# Patient Record
Sex: MALE | Race: WHITE | NOT HISPANIC OR LATINO | Employment: OTHER | URBAN - NONMETROPOLITAN AREA
[De-identification: names, ages, dates, MRNs, and addresses within clinical notes are randomized per-mention and may not be internally consistent; named-entity substitution may affect disease eponyms.]

---

## 2017-01-11 DIAGNOSIS — E78.2 MIXED HYPERLIPIDEMIA: Primary | ICD-10-CM

## 2017-01-11 DIAGNOSIS — Z78.9 STATIN INTOLERANCE: ICD-10-CM

## 2017-01-11 DIAGNOSIS — I25.10 ATHEROSCLEROSIS OF NATIVE CORONARY ARTERY OF NATIVE HEART WITHOUT ANGINA PECTORIS: ICD-10-CM

## 2017-01-11 NOTE — PROGRESS NOTES
Mr. Snow requested we send a prescription for Repatha to [facility]. He wants to try again to see if leg pain is tolerable. He was approved last year, but may have to repeat PA process with new plan year and new insurance.

## 2017-03-10 DIAGNOSIS — I25.118 ATHEROSCLEROSIS OF NATIVE CORONARY ARTERY OF NATIVE HEART WITH STABLE ANGINA PECTORIS (HCC): Primary | ICD-10-CM

## 2017-03-10 PROBLEM — I25.10 ATHEROSCLEROSIS OF NATIVE CORONARY ARTERY OF NATIVE HEART WITHOUT ANGINA PECTORIS: Status: ACTIVE | Noted: 2017-03-10

## 2017-03-10 RX ORDER — ISOSORBIDE MONONITRATE 30 MG/1
30 TABLET, EXTENDED RELEASE ORAL EVERY MORNING
Qty: 30 TABLET | Refills: 11 | Status: SHIPPED | OUTPATIENT
Start: 2017-03-10 | End: 2018-03-10

## 2018-12-14 DIAGNOSIS — I44.7 LEFT BUNDLE BRANCH BLOCK: ICD-10-CM

## 2018-12-14 DIAGNOSIS — I25.119 ATHEROSCLEROSIS OF NATIVE CORONARY ARTERY OF NATIVE HEART WITH ANGINA PECTORIS (HCC): Primary | ICD-10-CM

## 2018-12-14 NOTE — PROGRESS NOTES
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 He requested I order a stress test - has had recurrence of angina similar to but not as severe as previous angina before his stent in 2015. Stress sestamibi ordered. He would like to schedule for next Thursday 12/20/18 at 7 am at [facility]. Has history of LBBB so stress echocardiogram not indicated.

## 2018-12-27 DIAGNOSIS — R94.39 ABNORMAL NUCLEAR STRESS TEST: ICD-10-CM

## 2018-12-27 DIAGNOSIS — I25.118 ATHEROSCLEROSIS OF NATIVE CORONARY ARTERY OF NATIVE HEART WITH STABLE ANGINA PECTORIS (HCC): Primary | ICD-10-CM

## 2018-12-27 DIAGNOSIS — I20.8 STABLE ANGINA PECTORIS (HCC): ICD-10-CM

## 2018-12-27 PROBLEM — I20.89 STABLE ANGINA PECTORIS: Status: ACTIVE | Noted: 2018-12-27

## 2018-12-31 DIAGNOSIS — I44.7 LEFT BUNDLE BRANCH BLOCK: ICD-10-CM

## 2018-12-31 DIAGNOSIS — I25.119 ATHEROSCLEROSIS OF NATIVE CORONARY ARTERY OF NATIVE HEART WITH ANGINA PECTORIS (HCC): ICD-10-CM

## 2019-01-07 DIAGNOSIS — R94.39 ABNORMAL NUCLEAR STRESS TEST: ICD-10-CM

## 2019-01-07 DIAGNOSIS — I25.119 ATHEROSCLEROSIS OF NATIVE CORONARY ARTERY OF NATIVE HEART WITH ANGINA PECTORIS (HCC): Primary | ICD-10-CM

## 2019-01-09 ENCOUNTER — TELEPHONE (OUTPATIENT)
Dept: CARDIOLOGY | Facility: CLINIC | Age: 69
End: 2019-01-09

## 2019-01-09 DIAGNOSIS — R94.39 ABNORMAL NUCLEAR STRESS TEST: ICD-10-CM

## 2019-01-09 DIAGNOSIS — I25.119 ATHEROSCLEROSIS OF NATIVE CORONARY ARTERY OF NATIVE HEART WITH ANGINA PECTORIS (HCC): Primary | ICD-10-CM

## 2019-01-10 DIAGNOSIS — I25.119 ATHEROSCLEROSIS OF NATIVE CORONARY ARTERY OF NATIVE HEART WITH ANGINA PECTORIS (HCC): ICD-10-CM

## 2019-01-10 DIAGNOSIS — R94.39 ABNORMAL NUCLEAR STRESS TEST: ICD-10-CM

## 2019-01-11 ENCOUNTER — HOSPITAL ENCOUNTER (OUTPATIENT)
Facility: HOSPITAL | Age: 69
Discharge: HOME | End: 2019-01-11
Attending: INTERNAL MEDICINE | Admitting: INTERNAL MEDICINE

## 2019-01-11 VITALS
HEART RATE: 64 BPM | RESPIRATION RATE: 18 BRPM | DIASTOLIC BLOOD PRESSURE: 85 MMHG | BODY MASS INDEX: 31.24 KG/M2 | HEIGHT: 66 IN | SYSTOLIC BLOOD PRESSURE: 143 MMHG | WEIGHT: 194.4 LBS | OXYGEN SATURATION: 99 % | TEMPERATURE: 97.4 F

## 2019-01-11 DIAGNOSIS — R94.39 ABNORMAL NUCLEAR STRESS TEST: ICD-10-CM

## 2019-01-11 DIAGNOSIS — I25.119 ATHEROSCLEROSIS OF NATIVE CORONARY ARTERY OF NATIVE HEART WITH ANGINA PECTORIS (HCC): ICD-10-CM

## 2019-01-11 PROCEDURE — 25010000002 HEPARIN (PORCINE) PER 1000 UNITS: Performed by: INTERNAL MEDICINE

## 2019-01-11 PROCEDURE — 25010000002 FENTANYL CITRATE (PF) 100 MCG/2ML SOLUTION: Performed by: INTERNAL MEDICINE

## 2019-01-11 PROCEDURE — 25010000002 IOPAMIDOL 61 % SOLUTION: Performed by: INTERNAL MEDICINE

## 2019-01-11 PROCEDURE — 99152 MOD SED SAME PHYS/QHP 5/>YRS: CPT | Performed by: INTERNAL MEDICINE

## 2019-01-11 PROCEDURE — 25010000002 MIDAZOLAM PER 1 MG: Performed by: INTERNAL MEDICINE

## 2019-01-11 PROCEDURE — C1894 INTRO/SHEATH, NON-LASER: HCPCS | Performed by: INTERNAL MEDICINE

## 2019-01-11 PROCEDURE — 93458 L HRT ARTERY/VENTRICLE ANGIO: CPT | Performed by: INTERNAL MEDICINE

## 2019-01-11 PROCEDURE — C1760 CLOSURE DEV, VASC: HCPCS | Performed by: INTERNAL MEDICINE

## 2019-01-11 PROCEDURE — C1769 GUIDE WIRE: HCPCS | Performed by: INTERNAL MEDICINE

## 2019-01-11 RX ORDER — ACETAMINOPHEN 325 MG/1
650 TABLET ORAL EVERY 4 HOURS PRN
Status: DISCONTINUED | OUTPATIENT
Start: 2019-01-11 | End: 2019-01-11 | Stop reason: HOSPADM

## 2019-01-11 RX ORDER — SODIUM CHLORIDE 9 MG/ML
125 INJECTION, SOLUTION INTRAVENOUS CONTINUOUS
Status: DISPENSED | OUTPATIENT
Start: 2019-01-11 | End: 2019-01-11

## 2019-01-11 RX ORDER — EZETIMIBE 10 MG/1
10 TABLET ORAL DAILY
COMMUNITY

## 2019-01-11 RX ORDER — SODIUM CHLORIDE 0.9 % (FLUSH) 0.9 %
3 SYRINGE (ML) INJECTION EVERY 12 HOURS SCHEDULED
Status: DISCONTINUED | OUTPATIENT
Start: 2019-01-11 | End: 2019-01-11 | Stop reason: HOSPADM

## 2019-01-11 RX ORDER — PRASUGREL 10 MG/1
10 TABLET, FILM COATED ORAL DAILY
Status: DISCONTINUED | OUTPATIENT
Start: 2019-01-12 | End: 2019-01-11 | Stop reason: HOSPADM

## 2019-01-11 RX ORDER — FINASTERIDE 5 MG/1
5 TABLET, FILM COATED ORAL DAILY
COMMUNITY

## 2019-01-11 RX ORDER — SODIUM CHLORIDE 0.9 % (FLUSH) 0.9 %
3-10 SYRINGE (ML) INJECTION AS NEEDED
Status: DISCONTINUED | OUTPATIENT
Start: 2019-01-11 | End: 2019-01-11 | Stop reason: HOSPADM

## 2019-01-11 RX ORDER — LIDOCAINE HYDROCHLORIDE 20 MG/ML
INJECTION, SOLUTION INFILTRATION; PERINEURAL AS NEEDED
Status: DISCONTINUED | OUTPATIENT
Start: 2019-01-11 | End: 2019-01-11 | Stop reason: HOSPADM

## 2019-01-11 RX ORDER — MIDAZOLAM HYDROCHLORIDE 1 MG/ML
INJECTION INTRAMUSCULAR; INTRAVENOUS AS NEEDED
Status: DISCONTINUED | OUTPATIENT
Start: 2019-01-11 | End: 2019-01-11 | Stop reason: HOSPADM

## 2019-01-11 RX ORDER — MECLIZINE HCL 12.5 MG/1
12.5 TABLET ORAL 3 TIMES DAILY PRN
COMMUNITY

## 2019-01-11 RX ORDER — TAMSULOSIN HYDROCHLORIDE 0.4 MG/1
1 CAPSULE ORAL NIGHTLY
COMMUNITY
End: 2020-08-31

## 2019-01-11 RX ORDER — SODIUM CHLORIDE 9 MG/ML
1-3 INJECTION, SOLUTION INTRAVENOUS CONTINUOUS
Status: DISCONTINUED | OUTPATIENT
Start: 2019-01-11 | End: 2019-01-11 | Stop reason: HOSPADM

## 2019-01-11 RX ORDER — FENTANYL CITRATE 50 UG/ML
INJECTION, SOLUTION INTRAMUSCULAR; INTRAVENOUS AS NEEDED
Status: DISCONTINUED | OUTPATIENT
Start: 2019-01-11 | End: 2019-01-11 | Stop reason: HOSPADM

## 2019-01-11 RX ADMIN — SODIUM CHLORIDE 1 ML/KG/HR: 9 INJECTION, SOLUTION INTRAVENOUS at 11:35

## 2019-01-18 NOTE — H&P
No chief complaint on file.      Subjective .     History of present illness:  Apolinar Snow is a 68 y.o. yo male with history of chest pain and recent abnormal stress test  who presents today for cath.    History  Past Medical History:   Diagnosis Date   • Atherosclerosis of native coronary artery of native heart    • Chest pain    • Clotting disorder (CMS/HCC)    • Disease of thyroid gland    • Hyperlipidemia    • Hypertension    • LBBB (left bundle branch block)    ,   Past Surgical History:   Procedure Laterality Date   • APPENDECTOMY     • CARDIAC CATHETERIZATION      STENT   • CARDIAC CATHETERIZATION Left 1/11/2019    Procedure: Cardiac catheterization LCP with possible PCI;  Surgeon: Hipolito Quick MD;  Location: [facility] CATH INVASIVE LOCATION;  Service: Cardiology   • CHOLECYSTECTOMY     • CORONARY STENT PLACEMENT     • CYST REMOVAL     • KNEE ARTHROSCOPY     ,   Family History   Problem Relation Age of Onset   • Heart failure Mother    • Diabetes Mother    • Alzheimer's disease Mother    • Cancer Father    • Diabetes Brother    ,   Social History     Tobacco Use   • Smoking status: Never Smoker   • Smokeless tobacco: Never Used   Substance Use Topics   • Alcohol use: No   • Drug use: No   ,     Medications  No current facility-administered medications for this encounter.      Current Outpatient Medications   Medication Sig Dispense Refill   • acebutolol (SECTRAL) 200 MG capsule Take 200 mg by mouth 2 (two) times a day.     • aspirin 81 MG EC tablet Take 81 mg by mouth daily.     • cetirizine (ZyrTEC) 10 MG tablet Take 10 mg by mouth daily.     • coenzyme Q10 50 MG capsule capsule Take 50 mg by mouth daily.     • ezetimibe (ZETIA) 10 MG tablet Take 10 mg by mouth Daily.     • finasteride (PROSCAR) 5 MG tablet Take 5 mg by mouth Daily.     • levothyroxine (SYNTHROID, LEVOTHROID) 112 MCG tablet Take 112 mcg by mouth daily.     • meclizine (ANTIVERT) 12.5 MG tablet Take 12.5 mg by mouth 3 (Three) Times a Day  As Needed for dizziness.     • nitroglycerin (NITROSTAT) 0.4 MG SL tablet Place 0.4 mg under the tongue every 5 (five) minutes as needed for chest pain. Take no more than 3 doses in 15 minutes.     • tamsulosin (FLOMAX) 0.4 MG capsule 24 hr capsule Take 1 capsule by mouth Every Night.     • vitamin B-12 (CYANOCOBALAMIN) 500 MCG tablet Take 500 mcg by mouth daily.     • tadalafil (CIALIS) 10 MG tablet Take 1 tablet by mouth daily as needed for erectile dysfunction. 10 tablet 11   • vitamin D (ERGOCALCIFEROL) 62515 UNITS capsule capsule Take 50,000 Units by mouth 1 (one) time per week.         Allergies:  Patient has no known allergies.    Review of Systems  Review of Systems   HENT: Negative for nosebleeds.    Cardiovascular: Positive for chest pain. Negative for claudication, dyspnea on exertion, irregular heartbeat, leg swelling, near-syncope, orthopnea, palpitations, paroxysmal nocturnal dyspnea and syncope.   Respiratory: Negative for cough, hemoptysis and shortness of breath.    Gastrointestinal: Negative for dysphagia, hematemesis and melena.   Genitourinary: Negative for hematuria.   All other systems reviewed and are negative.      Objective     Physical Exam:  No data found.  Physical Exam   Constitutional: He is oriented to person, place, and time. He appears well-nourished. No distress.   HENT:   Head: Normocephalic.   Eyes: No scleral icterus.   Neck: Normal range of motion. Neck supple.   Cardiovascular: Normal rate, regular rhythm and normal heart sounds. Exam reveals no gallop and no friction rub.   No murmur heard.  Pulmonary/Chest: Effort normal and breath sounds normal. No respiratory distress. He has no wheezes. He has no rales.   Abdominal: Soft. Bowel sounds are normal. He exhibits no distension. There is no tenderness.   Musculoskeletal: He exhibits no edema.   Neurological: He is alert and oriented to person, place, and time.   Skin: Skin is warm and dry. He is not diaphoretic. No erythema.    Psychiatric: He has a normal mood and affect. His behavior is normal.       Results Review:   I reviewed the patient's new clinical results.  Lab Results (last 24 hours)     ** No results found for the last 24 hours. **        Imaging Results (last 24 hours)     ** No results found for the last 24 hours. **        No results found for: ECHOEFEST      Assessment/Plan     Abnormal nuclear stress test    New problems that need assessment:  1. Chest pain with abnormal stress test. Scheduled for cath. The risk and potential complications as well as anticipated benefits were discussed with the patient and they wish to proceed with the planned procedure

## 2019-04-12 ENCOUNTER — OFFICE VISIT (OUTPATIENT)
Dept: CARDIOLOGY | Facility: CLINIC | Age: 69
End: 2019-04-12

## 2019-04-12 VITALS
DIASTOLIC BLOOD PRESSURE: 80 MMHG | BODY MASS INDEX: 31.79 KG/M2 | HEART RATE: 69 BPM | HEIGHT: 66 IN | SYSTOLIC BLOOD PRESSURE: 120 MMHG | OXYGEN SATURATION: 97 % | WEIGHT: 197.8 LBS

## 2019-04-12 DIAGNOSIS — Z78.9 STATIN INTOLERANCE: Chronic | ICD-10-CM

## 2019-04-12 DIAGNOSIS — E78.2 MIXED HYPERLIPIDEMIA: Chronic | ICD-10-CM

## 2019-04-12 DIAGNOSIS — I10 ESSENTIAL HYPERTENSION: Primary | Chronic | ICD-10-CM

## 2019-04-12 DIAGNOSIS — I25.118 ATHEROSCLEROSIS OF NATIVE CORONARY ARTERY OF NATIVE HEART WITH STABLE ANGINA PECTORIS (HCC): Chronic | ICD-10-CM

## 2019-04-12 PROCEDURE — 99214 OFFICE O/P EST MOD 30 MIN: CPT | Performed by: NURSE PRACTITIONER

## 2019-04-12 NOTE — ASSESSMENT & PLAN NOTE
Fair control with Zetia. Encouraged healthy diet and routine aerobic exercise. May need to consider addition of PCSK9 inhibitor if LDL remains greater than 70. Due for wellness labs next month - he will send a copy of results.

## 2019-04-12 NOTE — PROGRESS NOTES
Subjective:     Encounter Date:04/12/2019    Chief Complaint:    Patient ID: Apolinar Snow is a 68 y.o. male here today for 2 year cardiac follow-up. He was last seen in the office by Dr. Randall 9/2016. He had a heart cath by Dr. Quick 1/2019 due to chest discomfort and an abnormal nuclear stress test 12/2018. He is a  at [employer] - retiring next month.     HPI     Coronary Artery Disease      Additional comments: has a twinge of chest discomfort (LINDA/LLchest) every few days, not effected by activity or eating, patent stent per 1/2019 cath              Hypertension      Additional comments: runs 120/70s              Hyperlipidemia      Additional comments: due to for wellness check and labs 5/2019          Last edited by Massiel Mirza APRN on 4/12/2019 12:49 PM. (History)        History:   Past Medical History:   Diagnosis Date   • Atherosclerosis of native coronary artery of native heart    • Chest pain    • Disease of thyroid gland    • Hyperlipidemia    • Hypertension    • LBBB (left bundle branch block)      Past Surgical History:   Procedure Laterality Date   • APPENDECTOMY     • CARDIAC CATHETERIZATION      STENT   • CARDIAC CATHETERIZATION Left 1/11/2019    Procedure: Cardiac catheterization LCP with possible PCI;  Surgeon: Hipolito Quick MD;  Location: [facility] CATH INVASIVE LOCATION;  Service: Cardiology   • CHOLECYSTECTOMY     • CORONARY STENT PLACEMENT     • CYST REMOVAL     • KNEE ARTHROSCOPY       Social History     Socioeconomic History   • Marital status:      Spouse name: Not on file   • Number of children: Not on file   • Years of education: Not on file   • Highest education level: Not on file   Tobacco Use   • Smoking status: Never Smoker   • Smokeless tobacco: Never Used   Substance and Sexual Activity   • Alcohol use: No   • Drug use: No   • Sexual activity: Defer     Family History   Problem Relation Age of Onset   • Heart failure Mother    • Diabetes Mother    •  Alzheimer's disease Mother    • Cancer Father    • Diabetes Brother        Outpatient Medications Marked as Taking for the 4/12/19 encounter (Office Visit) with Massiel Mirza APRN   Medication Sig Dispense Refill   • acebutolol (SECTRAL) 200 MG capsule Take 200 mg by mouth Daily.     • aspirin 81 MG EC tablet Take 81 mg by mouth daily.     • cetirizine (ZyrTEC) 10 MG tablet Take 10 mg by mouth daily.     • coenzyme Q10 100 MG capsule Take 100 mg by mouth Daily.     • ezetimibe (ZETIA) 10 MG tablet Take 10 mg by mouth Daily.     • finasteride (PROSCAR) 5 MG tablet Take 5 mg by mouth Daily.     • levothyroxine (SYNTHROID, LEVOTHROID) 112 MCG tablet Take 112 mcg by mouth daily.     • meclizine (ANTIVERT) 12.5 MG tablet Take 12.5 mg by mouth 3 (Three) Times a Day As Needed for dizziness.     • nitroglycerin (NITROSTAT) 0.4 MG SL tablet Place 0.4 mg under the tongue every 5 (five) minutes as needed for chest pain. Take no more than 3 doses in 15 minutes.     • tamsulosin (FLOMAX) 0.4 MG capsule 24 hr capsule Take 1 capsule by mouth Every Night.     • vitamin B-12 (CYANOCOBALAMIN) 500 MCG tablet Take 500 mcg by mouth daily.     • vitamin D (ERGOCALCIFEROL) 08777 UNITS capsule capsule Take 50,000 Units by mouth 1 (one) time per week.       Review of Systems:  Review of Systems   Constitution: Negative for chills, decreased appetite, fever, weakness and malaise/fatigue.   HENT: Negative for congestion and nosebleeds.    Eyes: Negative for blurred vision and double vision.   Cardiovascular: Positive for chest pain. Negative for dyspnea on exertion, irregular heartbeat, leg swelling, near-syncope, palpitations and syncope.   Respiratory: Negative for cough, shortness of breath, sputum production and wheezing.    Endocrine: Negative for cold intolerance and heat intolerance.   Skin: Negative for dry skin, itching and rash.   Musculoskeletal: Positive for back pain. Negative for arthritis, falls, joint pain, joint  "swelling, muscle cramps, neck pain and stiffness.   Gastrointestinal: Positive for diarrhea (occ ). Negative for abdominal pain, constipation, heartburn, melena, nausea and vomiting.   Genitourinary: Positive for nocturia (1 to 2 times a night  ). Negative for hematuria.   Neurological: Positive for loss of balance (occ ). Negative for excessive daytime sleepiness, dizziness, headaches, light-headedness and numbness.   Psychiatric/Behavioral: Negative for depression. The patient does not have insomnia and is not nervous/anxious.           Objective:   /80 (BP Location: Left arm, Patient Position: Sitting, Cuff Size: Adult)   Pulse 69   Ht 167.6 cm (66\")   Wt 89.7 kg (197 lb 12.8 oz)   SpO2 97%   BMI 31.93 kg/m²   Wt Readings from Last 3 Encounters:   04/12/19 89.7 kg (197 lb 12.8 oz)   01/11/19 88.2 kg (194 lb 6.4 oz)   09/21/16 89.8 kg (198 lb)         Physical Exam   Constitutional: He is oriented to person, place, and time. He appears well-developed and well-nourished.   HENT:   Head: Normocephalic and atraumatic.   Right Ear: Decreased hearing (mild) is noted.   Left Ear: Decreased hearing (mild) is noted.   Eyes: No scleral icterus.   Neck: No JVD present.   Cardiovascular: Normal rate, regular rhythm, normal heart sounds and intact distal pulses. Exam reveals no gallop and no friction rub.   No murmur heard.  Pulmonary/Chest: Effort normal and breath sounds normal.   Musculoskeletal: He exhibits no edema.   Neurological: He is alert and oriented to person, place, and time.   Skin: Skin is warm and dry.   Psychiatric: He has a normal mood and affect.   Vitals reviewed.    Lab/Diagnostics Review:   1/11/2019 cardiac catheterization  Nonobstructive CAD  Normal LV function  False positive stress test    1/10/2019  CBC WBC 8500 hemoglobin 14.7 hematocrit 43.2% platelets 222,000  CMP sodium 143 potassium 4.2 chloride 106 CO2 29.8 BUN 17 creatinine 1.1 total protein 7.0 albumin 4.0 total bilirubin 1.07 alk " phos 77 AST 30 ALT 45  Lipid panel total cholesterol 142 triglycerides 77 HDL 49 LDL 78    7/17/2018  Lipid panel total cholesterol 121 triglycerides 59 HDL 35 LDL 74  TSH 1.053 free T4 1.12 free T3 3.24  Vitamin D 59    2/20/2015 cardiac catheterization  1. Successful Xience 2.5 x 15 mm drug-eluting stent placement to the 90% stenosis of the distal left circumflex coronary artery just prior to the takeoff of the left posterior descending. Dr. Quick  2. Normal left ventricular function.      Procedures: none in office today        Assessment/Plan:         Problem List Items Addressed This Visit        Cardiovascular and Mediastinum    Hypertension - Primary (Chronic)    Current Assessment & Plan     Well controlled with medications. Continue present therapy.            Hyperlipidemia (Chronic)    Current Assessment & Plan     Fair control with Zetia. Encouraged healthy diet and routine aerobic exercise. May need to consider addition of PCSK9 inhibitor if LDL remains greater than 70. Due for wellness labs next month - he will send a copy of results.         Atherosclerosis of native coronary artery of native heart (Chronic)    Current Assessment & Plan     Stable. Continue present therapy. Call if any worsening.              Other    Statin intolerance (Chronic)        Return in about 2 years (around 4/12/2021) for Recheck.           Massiel Mirza, APRN, ACNP-BC, CHFN-BC

## 2020-08-31 ENCOUNTER — OFFICE VISIT (OUTPATIENT)
Dept: CARDIOLOGY | Facility: CLINIC | Age: 70
End: 2020-08-31

## 2020-08-31 VITALS
WEIGHT: 200.4 LBS | HEART RATE: 72 BPM | OXYGEN SATURATION: 99 % | HEIGHT: 65 IN | DIASTOLIC BLOOD PRESSURE: 84 MMHG | BODY MASS INDEX: 33.39 KG/M2 | SYSTOLIC BLOOD PRESSURE: 138 MMHG | TEMPERATURE: 97.8 F

## 2020-08-31 DIAGNOSIS — Z78.9 STATIN INTOLERANCE: Chronic | ICD-10-CM

## 2020-08-31 DIAGNOSIS — I25.10 ATHEROSCLEROSIS OF NATIVE CORONARY ARTERY OF NATIVE HEART WITHOUT ANGINA PECTORIS: Primary | Chronic | ICD-10-CM

## 2020-08-31 DIAGNOSIS — I10 ESSENTIAL HYPERTENSION: Chronic | ICD-10-CM

## 2020-08-31 DIAGNOSIS — E66.09 NON MORBID OBESITY DUE TO EXCESS CALORIES: Chronic | ICD-10-CM

## 2020-08-31 PROCEDURE — 99214 OFFICE O/P EST MOD 30 MIN: CPT | Performed by: NURSE PRACTITIONER

## 2020-08-31 PROCEDURE — 93000 ELECTROCARDIOGRAM COMPLETE: CPT | Performed by: NURSE PRACTITIONER

## 2020-08-31 RX ORDER — ONDANSETRON 8 MG/1
TABLET, ORALLY DISINTEGRATING ORAL
COMMUNITY

## 2020-08-31 RX ORDER — ALFUZOSIN HYDROCHLORIDE 10 MG/1
TABLET, EXTENDED RELEASE ORAL
COMMUNITY

## 2020-08-31 RX ORDER — MELATONIN
1000 DAILY
COMMUNITY
End: 2021-09-20 | Stop reason: ALTCHOICE

## 2020-08-31 RX ORDER — LANOLIN ALCOHOL/MO/W.PET/CERES
CREAM (GRAM) TOPICAL
COMMUNITY
End: 2021-09-20 | Stop reason: ALTCHOICE

## 2020-08-31 RX ORDER — NAPROXEN 500 MG/1
TABLET ORAL
COMMUNITY

## 2020-08-31 RX ORDER — DIMENHYDRINATE 50 MG
100 TABLET ORAL DAILY
COMMUNITY

## 2020-08-31 NOTE — ASSESSMENT & PLAN NOTE
Encouraged weight loss with healthy diet and gradual increase in routine aerobic activity. Goal BMI less than 30.

## 2020-08-31 NOTE — ASSESSMENT & PLAN NOTE
Fair control with Zetia. Statin intolerance. Encouraged healthy diet and routine aerobic exercise. May need to consider addition of PCSK9 inhibitor if LDL remains greater than 70.

## 2020-08-31 NOTE — ASSESSMENT & PLAN NOTE
Previously well controlled with medications. Continue present therapy. Monitor BP and call if persistently greater than 130/80 per home readings. May need to increase or add medications.

## 2020-08-31 NOTE — ASSESSMENT & PLAN NOTE
Stable without angina. Continue present therapy. Call if worsens. Encouraged lifestyle modifications.

## 2020-08-31 NOTE — PROGRESS NOTES
Subjective:     Encounter Date:08/31/2020    Chief Complaint:    Patient ID: Apolinar Snow is a 69 y.o. male here today for yearly cardiac follow-up. He has done well other than being hospitalized with MRSA requiring I&D and IV antibiotics in May after cleaning his daughter's pool. He is a retired  from [employer].    HPI     Coronary Artery Disease      Additional comments: Patent stent with 40% RCA per 1/2019 cath after false positive stress, no chest discomfort, working at home but not exercising - doing all the housework - wife has inflammatory myopathy. 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              Hypertension      Additional comments: doesn't check at home              Hyperlipidemia      Additional comments: had labs 6/2020 - reportedly good          Last edited by Massiel Mirza APRN on 8/31/2020  5:23 PM. (History)        History:   Past Medical History:   Diagnosis Date   • Atherosclerosis of native coronary artery of native heart    • Chest pain    • Disease of thyroid gland    • Hyperlipidemia    • Hypertension    • LBBB (left bundle branch block)      Past Surgical History:   Procedure Laterality Date   • APPENDECTOMY     • CARDIAC CATHETERIZATION      STENT   • CARDIAC CATHETERIZATION Left 1/11/2019    Procedure: Cardiac catheterization LCP with possible PCI;  Surgeon: Hipolito Quick MD;  Location: [facility] CATH INVASIVE LOCATION;  Service: Cardiology   • CHOLECYSTECTOMY     • CORONARY STENT PLACEMENT  02/20/2015    [facility], Dr. Quick, L CX Xience 2.5x15 mm MAGDA   • CYST REMOVAL     • KNEE ARTHROSCOPY       Social History     Socioeconomic History   • Marital status:      Spouse name: Not on file   • Number of children: Not on file   • Years of education: Not on file   • Highest education level: Not on file   Tobacco Use   • Smoking status: Never Smoker   • Smokeless tobacco: Never Used   Substance and Sexual Activity   • Alcohol use: Not Currently      Frequency: Never     Comment: never drank regularly or heavy - occasional beer in the past   • Drug use: Never   • Sexual activity: Defer     Family History   Problem Relation Age of Onset   • Heart failure Mother    • Diabetes Mother    • Alzheimer's disease Mother    • Prostate cancer Father    • Diabetes Brother      Outpatient Medications Marked as Taking for the 8/31/20 encounter (Office Visit) with Massiel Mirza APRN   Medication Sig Dispense Refill   • acebutolol (SECTRAL) 200 MG capsule Take 200 mg by mouth Daily.     • alfuzosin (UROXATRAL) 10 MG 24 hr tablet alfuzosin ER 10 mg tablet,extended release 24 hr     • aspirin 81 MG EC tablet Take 81 mg by mouth daily.     • cetirizine (ZyrTEC) 10 MG tablet Take 10 mg by mouth daily.     • cholecalciferol (VITAMIN D3) 25 MCG (1000 UT) tablet Take 1,000 Units by mouth Daily.     • Coenzyme Q10 (CO Q-10) 100 MG capsule Take 100 mg by mouth Daily.     • coenzyme Q10 100 MG capsule Take 100 mg by mouth Daily.     • ezetimibe (ZETIA) 10 MG tablet Take 10 mg by mouth Daily.     • finasteride (PROSCAR) 5 MG tablet Take 5 mg by mouth Daily.     • levothyroxine (SYNTHROID, LEVOTHROID) 125 MCG tablet Take 125 mcg by mouth Daily.     • meclizine (ANTIVERT) 12.5 MG tablet Take 12.5 mg by mouth 3 (Three) Times a Day As Needed for dizziness.     • naproxen (NAPROSYN) 500 MG tablet naproxen 500 mg tablet   prn     • nitroglycerin (NITROSTAT) 0.4 MG SL tablet Place 0.4 mg under the tongue every 5 (five) minutes as needed for chest pain. Take no more than 3 doses in 15 minutes.     • ondansetron ODT (ZOFRAN-ODT) 8 MG disintegrating tablet ondansetron 8 mg disintegrating tablet     • SUPER B COMPLEX/C PO B Complex 1.7 mg-20 mg-2 mg-1.2 mg/mL sublingual liquid   Place by sublingual route.     • vitamin B-12 (CYANOCOBALAMIN) 1000 MCG tablet B12-Resin 1,000 mcg tablet   Take 1 tablet every day by oral route.     • vitamin B-12 (CYANOCOBALAMIN) 500 MCG tablet Take 500 mcg by  "mouth daily.     • vitamin D (ERGOCALCIFEROL) 64822 UNITS capsule capsule Take 50,000 Units by mouth 1 (one) time per week.       Review of Systems:  Review of Systems   Constitution: Negative for chills, decreased appetite, fever and malaise/fatigue.   HENT: Negative for congestion and nosebleeds.    Eyes: Negative for blurred vision and double vision.   Cardiovascular: Negative for chest pain, dyspnea on exertion, irregular heartbeat, leg swelling, near-syncope, palpitations and syncope.   Respiratory: Negative for cough, shortness of breath, sputum production and wheezing.    Endocrine: Negative for cold intolerance and heat intolerance.   Hematologic/Lymphatic: Does not bruise/bleed easily.   Skin: Negative for dry skin, itching and rash.   Musculoskeletal: Positive for back pain. Negative for arthritis, falls, joint pain, joint swelling, muscle cramps, neck pain and stiffness.   Gastrointestinal: Negative for abdominal pain, constipation, diarrhea (occ ), heartburn, melena, nausea and vomiting.   Genitourinary: Positive for nocturia (1 to 2 times a night  ). Negative for hematuria.   Neurological: Positive for loss of balance (occ ). Negative for excessive daytime sleepiness, dizziness, headaches, light-headedness, numbness and weakness.   Psychiatric/Behavioral: Negative for depression. The patient does not have insomnia and is not nervous/anxious.         Objective:   /84 (BP Location: Left arm, Patient Position: Sitting, Cuff Size: Adult)   Pulse 72   Temp 97.8 °F (36.6 °C)   Ht 165.1 cm (65\")   Wt 90.9 kg (200 lb 6.4 oz)   SpO2 99%   BMI 33.35 kg/m²   Wt Readings from Last 3 Encounters:   08/31/20 90.9 kg (200 lb 6.4 oz)   04/12/19 89.7 kg (197 lb 12.8 oz)   01/11/19 88.2 kg (194 lb 6.4 oz)       Physical Exam   Constitutional: He is oriented to person, place, and time. He appears well-developed and well-nourished.   HENT:   Head: Normocephalic and atraumatic.   Right Ear: Decreased hearing " (mild) is noted.   Left Ear: Decreased hearing (mild) is noted.   Eyes: No scleral icterus.   Neck: No JVD present.   Cardiovascular: Normal rate, regular rhythm, normal heart sounds and intact distal pulses. Exam reveals no gallop and no friction rub.   No murmur heard.  Pulmonary/Chest: Effort normal and breath sounds normal.   Musculoskeletal: He exhibits no edema.   Neurological: He is alert and oriented to person, place, and time.   Skin: Skin is warm and dry.   Psychiatric: He has a normal mood and affect.   Vitals reviewed.    Lab/Diagnostics Review:   6/29/2020 [facility]  CBC WBC 6200 hemoglobin 14.9 hematocrit 44.3% platelets 194,000  CMP sodium 143 potassium 4.1 chloride 103 CO2 32 BUN 21 creatinine 1.13 calcium 9.4 glucose 92 total protein 6.7 albumin 4.5 alk phos 57 AST 25 ALT 26 total bilirubin 1.3  Vitamin D 62.3  Lipid panel total cholesterol 151 triglycerides 88 HDL 43 LDL 90    5/21/2020 [facility]  CBC WBC 11,500 hemoglobin 14.3 hematocrit 41.6% platelets 182,000  BMP sodium 140 potassium 3.9 chloride 104 CO2 26.2 BUN 14 creatinine 1.13 calcium 9.2 glucose 106    [facility]  [facility]  Normal LV function  False positive stress test     1/10/2019  CBC WBC 8500 hemoglobin 14.7 hematocrit 43.2% platelets 222,000  CMP sodium 143 potassium 4.2 chloride 106 CO2 29.8 BUN 17 creatinine 1.1 total protein 7.0 albumin 4.0 total bilirubin 1.07 alk phos 77 AST 30 ALT 45  Lipid panel total cholesterol 142 triglycerides 77 HDL 49 LDL 78     7/17/2018  Lipid panel total cholesterol 121 triglycerides 59 HDL 35 LDL 74  TSH 1.053 free T4 1.12 free T3 3.24  Vitamin D 59     2/20/2015 cardiac catheterization, [facility], Dr. Quick  1. Successful Xience 2.5 x 15 mm drug-eluting stent placement to the 90% stenosis of the distal left circumflex coronary artery just prior to the takeoff of the left posterior descending. Dr. Quick  2. Normal left ventricular function.     ECG 12  Lead  Date/Time: 8/31/2020 2:04 PM  Performed by: Massiel Mirza APRN  Authorized by: Massiel Mirza APRN   Rhythm: sinus rhythm  Rate: normal  BPM: 63  Conduction: left bundle branch block and 1st degree AV block    Clinical impression: abnormal EKG              Assessment/Plan:       Problem List Items Addressed This Visit        Cardiovascular and Mediastinum    Atherosclerosis of native coronary artery of native heart - Primary (Chronic)    Current Assessment & Plan     Stable without angina. Continue present therapy. Call if worsens. Encouraged lifestyle modifications.           Relevant Orders    ECG 12 Lead (Completed)    Hypertension (Chronic)    Current Assessment & Plan     Previously well controlled with medications. Continue present therapy. Monitor BP and call if persistently greater than 130/80 per home readings. May need to increase or add medications.              Digestive    Non morbid obesity due to excess calories (Chronic)    Current Assessment & Plan     Encouraged weight loss with healthy diet and gradual increase in routine aerobic activity. Goal BMI less than 30.            Other    Statin intolerance (Chronic)        Advance Care Planning   ACP discussion was held with the patient during this visit. Patient has an advance directive (not in EMR), copy requested.    Return in about 1 year (around 8/31/2021) for Recheck.         JOSEPH Gallardo, ACNP-BC, CHFN-BC

## 2021-03-08 ENCOUNTER — TELEPHONE (OUTPATIENT)
Dept: CARDIOLOGY | Facility: CLINIC | Age: 71
End: 2021-03-08

## 2021-03-08 DIAGNOSIS — R06.09 DYSPNEA ON EXERTION: ICD-10-CM

## 2021-03-08 DIAGNOSIS — I25.118 ATHEROSCLEROSIS OF NATIVE CORONARY ARTERY OF NATIVE HEART WITH OTHER FORM OF ANGINA PECTORIS (HCC): Primary | Chronic | ICD-10-CM

## 2021-03-08 NOTE — TELEPHONE ENCOUNTER
Wants a stress test, having SOB and would like to have one since last stress was in 2018. He states with physical activity is when he is having to SOB.

## 2021-03-16 ENCOUNTER — OUTSIDE FACILITY SERVICE (OUTPATIENT)
Dept: CARDIOLOGY | Facility: CLINIC | Age: 71
End: 2021-03-16

## 2021-03-16 PROCEDURE — 93350 STRESS TTE ONLY: CPT | Performed by: INTERNAL MEDICINE

## 2021-03-16 PROCEDURE — 93018 CV STRESS TEST I&R ONLY: CPT | Performed by: INTERNAL MEDICINE

## 2021-03-17 DIAGNOSIS — R06.09 DYSPNEA ON EXERTION: ICD-10-CM

## 2021-03-17 DIAGNOSIS — I25.118 ATHEROSCLEROSIS OF NATIVE CORONARY ARTERY OF NATIVE HEART WITH OTHER FORM OF ANGINA PECTORIS (HCC): Chronic | ICD-10-CM

## 2021-03-17 NOTE — PROGRESS NOTES
Notified patient of technically difficult stress echocardiogram results most likely due to L BBB. He reports others have noticed he was SOA with exertion, but he hasn't noticed. Will call if symptoms worsen. He declines echocardiogram at this time. Will only order nuclear perfusion studies in the future due to chronic L BBB.

## 2021-09-20 ENCOUNTER — OFFICE VISIT (OUTPATIENT)
Dept: CARDIOLOGY | Facility: CLINIC | Age: 71
End: 2021-09-20

## 2021-09-20 VITALS
SYSTOLIC BLOOD PRESSURE: 130 MMHG | OXYGEN SATURATION: 99 % | WEIGHT: 196.2 LBS | DIASTOLIC BLOOD PRESSURE: 90 MMHG | BODY MASS INDEX: 31.53 KG/M2 | HEIGHT: 66 IN | HEART RATE: 64 BPM

## 2021-09-20 DIAGNOSIS — E78.2 MIXED HYPERLIPIDEMIA: Chronic | ICD-10-CM

## 2021-09-20 DIAGNOSIS — I25.10 ATHEROSCLEROSIS OF NATIVE CORONARY ARTERY OF NATIVE HEART WITHOUT ANGINA PECTORIS: Primary | Chronic | ICD-10-CM

## 2021-09-20 DIAGNOSIS — Z78.9 STATIN INTOLERANCE: Chronic | ICD-10-CM

## 2021-09-20 DIAGNOSIS — I10 ESSENTIAL HYPERTENSION: Chronic | ICD-10-CM

## 2021-09-20 DIAGNOSIS — E66.09 NON MORBID OBESITY DUE TO EXCESS CALORIES: Chronic | ICD-10-CM

## 2021-09-20 PROCEDURE — 93000 ELECTROCARDIOGRAM COMPLETE: CPT | Performed by: NURSE PRACTITIONER

## 2021-09-20 PROCEDURE — 99214 OFFICE O/P EST MOD 30 MIN: CPT | Performed by: NURSE PRACTITIONER

## 2021-09-20 RX ORDER — FLUOXETINE 10 MG/1
10 CAPSULE ORAL DAILY
COMMUNITY

## 2021-09-20 RX ORDER — COLESEVELAM 180 1/1
625 TABLET ORAL DAILY
COMMUNITY

## 2021-09-20 RX ORDER — OXYBUTYNIN CHLORIDE 5 MG/1
TABLET ORAL
COMMUNITY

## 2021-09-20 NOTE — ASSESSMENT & PLAN NOTE
Patient's (Body mass index is 31.67 kg/m².) indicates that they are obese (BMI >30) with health conditions that include hypertension, coronary heart disease and dyslipidemias . Weight is improving with lifestyle modifications. BMI is is above average; BMI management plan is completed. We discussed portion control and increasing exercise.

## 2021-09-20 NOTE — PROGRESS NOTES
Encounter Date:09/20/2021  Chief Complaint:   Subjective    Apolinar Snow is a 70 y.o. male who presents to [facility] today for yearly cardiac follow-up. 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      History of Present Illness   HPI     Coronary Artery Disease      Additional comments: no chest discomfort, hasn't been exercising due to knee fracture and torn ligament. Patent stent 40% RCA per 1/2019 cath after false positive stress. 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              Hypertension      Additional comments: checks occasionally, running a little high 140/80s sometimes but mostly 120s/70s. No headaches, dizziness, or nosebleeds.              Follow-up      Additional comments: 1 yr              Hyperlipidemia      Additional comments: not as well controlled per 9/8/21 labs compared to 6/2020           Last edited by Massiel Mirza APRN on 9/20/2021  2:10 PM. (History)        History: Past medical, surgical, family, and social history reviewed.    Outpatient Medications Marked as Taking for the 9/20/21 encounter (Office Visit) with Massiel Mirza APRN   Medication Sig Dispense Refill   • acebutolol (SECTRAL) 200 MG capsule Take 200 mg by mouth Daily.     • alfuzosin (UROXATRAL) 10 MG 24 hr tablet alfuzosin ER 10 mg tablet,extended release 24 hr     • aspirin 81 MG EC tablet Take 81 mg by mouth daily.     • cetirizine (ZyrTEC) 10 MG tablet Take 10 mg by mouth daily.     • Coenzyme Q10 (CO Q-10) 100 MG capsule Take 100 mg by mouth Daily.     • colesevelam (WELCHOL) 625 MG tablet Take 625 mg by mouth Daily.     • ezetimibe (ZETIA) 10 MG tablet Take 10 mg by mouth Daily.     • finasteride (PROSCAR) 5 MG tablet Take 5 mg by mouth Daily.     • FLUoxetine (PROzac) 10 MG capsule Take 10 mg by mouth Daily.     • levothyroxine (SYNTHROID, LEVOTHROID) 125 MCG tablet Take 150 mcg by mouth Daily.     • meclizine (ANTIVERT) 12.5 MG  "tablet Take 12.5 mg by mouth 3 (Three) Times a Day As Needed for dizziness.     • nitroglycerin (NITROSTAT) 0.4 MG SL tablet Place 0.4 mg under the tongue every 5 (five) minutes as needed for chest pain. Take no more than 3 doses in 15 minutes.     • ondansetron ODT (ZOFRAN-ODT) 8 MG disintegrating tablet ondansetron 8 mg disintegrating tablet     • oxybutynin (DITROPAN) 5 MG tablet oxybutynin chloride 5 mg tablet     • vitamin B-12 (CYANOCOBALAMIN) 500 MCG tablet Take 250 mcg by mouth Daily.     • vitamin D (ERGOCALCIFEROL) 74950 UNITS capsule capsule Take 50,000 Units by mouth 1 (one) time per week.     • [DISCONTINUED] cholecalciferol (VITAMIN D3) 25 MCG (1000 UT) tablet Take 1,000 Units by mouth Daily.     • [DISCONTINUED] coenzyme Q10 100 MG capsule Take 100 mg by mouth Daily.     • [DISCONTINUED] vitamin B-12 (CYANOCOBALAMIN) 1000 MCG tablet B12-Resin 1,000 mcg tablet   Take 1 tablet every day by oral route.          Objective     Vital Signs:   /90 (BP Location: Left arm, Patient Position: Sitting, Cuff Size: Adult)   Pulse 64   Ht 167.6 cm (66\")   Wt 89 kg (196 lb 3.2 oz)   SpO2 99%   BMI 31.67 kg/m²   Wt Readings from Last 3 Encounters:   09/20/21 89 kg (196 lb 3.2 oz)   08/31/20 90.9 kg (200 lb 6.4 oz)   04/12/19 89.7 kg (197 lb 12.8 oz)         Vitals reviewed.   Constitutional:       Appearance: Well-developed.   Eyes:      General: No scleral icterus.  Neck:      Vascular: No JVD.   Pulmonary:      Effort: Pulmonary effort is normal.      Breath sounds: Normal breath sounds.   Cardiovascular:      Normal rate. Regular rhythm.      No gallop.   Pulses:     Intact distal pulses.   Edema:     Peripheral edema absent.   Skin:     General: Skin is warm and dry.   Neurological:      Mental Status: Alert and oriented to person, place, and time.         Result Review  Data Reviewed:  The following data was reviewed by: JOSEPH Gómez on 09/20/2021 9/8/21 CBC, CMP, Lipid, TSH, Free T4, " Free T3, TPO AB, PSA, Iodine, Vitamin D, and Vitamin B12 reviewed per pt provided copy of labs.  Scan on 6/29/2020 by Massiel Mirza APRN: LIPID/CBC/CMP/NUBIA&DELGADO/6-29-20    SCANNED - STRESS TEST (03/16/2021 00:00)  Cardiac Catheterization/Vascular Study (01/11/2019 12:56)  Cardiac catheterization (02/20/2015 11:37)             ECG 12 Lead    Date/Time: 9/20/2021 2:10 PM  Performed by: Massiel Mirza APRN  Authorized by: Massiel Mirza APRN   Comparison: compared with previous ECG from 8/31/2020  Comparison to previous ECG: L BBB no longer present and first degree decreased from 212 ms to 200 ms  Rhythm: sinus rhythm  Rate: normal  BPM: 65  Other findings: T wave abnormality  Other findings comments: new anterior T wave inversions    Clinical impression: abnormal EKG               Assessment and Plan   Problem List Items Addressed This Visit        Allergies and Adverse Reactions    Statin intolerance (Chronic)       Cardiac and Vasculature    Atherosclerosis of native coronary artery of native heart - Primary (Chronic)    Current Assessment & Plan     Remains stable without angina. Continue present therapy. Call if worsens. Encouraged lifestyle modifications.           Relevant Orders    ECG 12 Lead    Hyperlipidemia (Chronic)    Current Assessment & Plan     Not as well controlled with Zetia and Welchol. Hx statin intolerance. Encouraged healthy diet and routine aerobic exercise. May need to consider addition of PCSK9 inhibitor if LDL remains greater than 70.         Relevant Medications    colesevelam (WELCHOL) 625 MG tablet    Hypertension (Chronic)    Current Assessment & Plan     Reportedly well controlled per home readings with medications. Continue present therapy. Monitor BP and call if persistently greater than 130/80 per home readings.               Endocrine and Metabolic    Non morbid obesity due to excess calories (Chronic)    Current Assessment & Plan     Patient's (Body mass  index is 31.67 kg/m².) indicates that they are obese (BMI >30) with health conditions that include hypertension, coronary heart disease and dyslipidemias . Weight is improving with lifestyle modifications. BMI is is above average; BMI management plan is completed. We discussed portion control and increasing exercise.              Patient was given instructions and counseling regarding his condition or for health maintenance advice. Please see specific information pulled into the AVS if appropriate.    Advance Care Planning   ACP discussion was held with the patient during this visit. Patient has an advance directive (not in EMR), copy requested.    Follow Up :   Return in about 1 year (around 9/20/2022) for Recheck.             Massiel Mirza, APRN, ACNP-BC, CHFN-BC

## 2021-09-20 NOTE — ASSESSMENT & PLAN NOTE
Remains stable without angina. Continue present therapy. Call if worsens. Encouraged lifestyle modifications.

## 2021-09-20 NOTE — ASSESSMENT & PLAN NOTE
Reportedly well controlled per home readings with medications. Continue present therapy. Monitor BP and call if persistently greater than 130/80 per home readings.

## 2021-09-20 NOTE — ASSESSMENT & PLAN NOTE
Not as well controlled with Zetia and Welchol. Hx statin intolerance. Encouraged healthy diet and routine aerobic exercise. May need to consider addition of PCSK9 inhibitor if LDL remains greater than 70.

## 2022-09-20 ENCOUNTER — OFFICE VISIT (OUTPATIENT)
Dept: CARDIOLOGY | Facility: CLINIC | Age: 72
End: 2022-09-20

## 2022-09-20 VITALS
HEART RATE: 70 BPM | HEIGHT: 66 IN | SYSTOLIC BLOOD PRESSURE: 148 MMHG | WEIGHT: 189.6 LBS | OXYGEN SATURATION: 99 % | BODY MASS INDEX: 30.47 KG/M2 | DIASTOLIC BLOOD PRESSURE: 74 MMHG

## 2022-09-20 DIAGNOSIS — Z78.9 STATIN INTOLERANCE: Chronic | ICD-10-CM

## 2022-09-20 DIAGNOSIS — I10 PRIMARY HYPERTENSION: Chronic | ICD-10-CM

## 2022-09-20 DIAGNOSIS — I25.10 ATHEROSCLEROSIS OF NATIVE CORONARY ARTERY OF NATIVE HEART WITHOUT ANGINA PECTORIS: Primary | Chronic | ICD-10-CM

## 2022-09-20 DIAGNOSIS — E78.2 MIXED HYPERLIPIDEMIA: Chronic | ICD-10-CM

## 2022-09-20 DIAGNOSIS — E66.09 NON MORBID OBESITY DUE TO EXCESS CALORIES: Chronic | ICD-10-CM

## 2022-09-20 PROCEDURE — 93000 ELECTROCARDIOGRAM COMPLETE: CPT | Performed by: NURSE PRACTITIONER

## 2022-09-20 PROCEDURE — 99214 OFFICE O/P EST MOD 30 MIN: CPT | Performed by: NURSE PRACTITIONER

## 2022-09-20 NOTE — ASSESSMENT & PLAN NOTE
Patient's (Body mass index is 30.62 kg/m².) indicates that they are obese (BMI >30) with health conditions that include hypertension, coronary heart disease and dyslipidemias . Weight is improving with lifestyle modifications. BMI is is above average; BMI management plan is completed. We discussed portion control and increasing exercise.

## 2022-09-20 NOTE — ASSESSMENT & PLAN NOTE
Still not quite controlled with Zetia and Welchol. Hx statin intolerance. Encouraged healthy diet and routine aerobic exercise. His is interested in Leqvio since LDL remains greater than 70.

## 2022-09-20 NOTE — PROGRESS NOTES
"Encounter Date:09/20/2022  Chief Complaint:   Subjective    Apolinar Snow is a 71 y.o. male who presents to [facility] today for yearly cardiac follow-up. He continues to do well and stays active (wife unable to do housework).      History of Present Illness   HPI     Hypertension      Additional comments: \"Normal\" 120/70s. Forgot to take BP meds last night which is rare. No headaches, dizziness, or nosebleeds.              Hyperlipidemia      Additional comments: Fairly well controlled per 9/13/22 labs x HDL 36 and LDL 91.              Follow-up      Additional comments: 1 yr fu              Coronary Artery Disease      Additional comments: No chest discomfort. Good exercise tolerance. 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 Patent stent 40% RCA per 1/2019 cath after false positive stress. 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          Last edited by Massiel Mirza APRN on 9/20/2022  2:34 PM. (History)        History: Past medical, surgical, family, and social history reviewed.    Outpatient Medications Marked as Taking for the 9/20/22 encounter (Office Visit) with Massiel Mirza APRN   Medication Sig Dispense Refill   • acebutolol (SECTRAL) 200 MG capsule Take 200 mg by mouth Daily.     • alfuzosin (UROXATRAL) 10 MG 24 hr tablet alfuzosin ER 10 mg tablet,extended release 24 hr     • aspirin 81 MG EC tablet Take 81 mg by mouth daily.     • cetirizine (ZyrTEC) 10 MG tablet Take 10 mg by mouth daily.     • Coenzyme Q10 (CO Q-10) 100 MG capsule Take 100 mg by mouth Daily.     • colesevelam (WELCHOL) 625 MG tablet Take 625 mg by mouth Daily.     • ezetimibe (ZETIA) 10 MG tablet Take 10 mg by mouth Daily.     • finasteride (PROSCAR) 5 MG tablet Take 5 mg by mouth Daily.     • FLUoxetine (PROzac) 10 MG capsule Take 10 mg by mouth Daily.     • levothyroxine (SYNTHROID, LEVOTHROID) 125 MCG tablet Take " "150 mcg by mouth Daily.     • meclizine (ANTIVERT) 12.5 MG tablet Take 12.5 mg by mouth 3 (Three) Times a Day As Needed for dizziness.     • naproxen (NAPROSYN) 500 MG tablet naproxen 500 mg tablet   prn     • nitroglycerin (NITROSTAT) 0.4 MG SL tablet Place 0.4 mg under the tongue every 5 (five) minutes as needed for chest pain. Take no more than 3 doses in 15 minutes.     • ondansetron ODT (ZOFRAN-ODT) 8 MG disintegrating tablet ondansetron 8 mg disintegrating tablet     • oxybutynin (DITROPAN) 5 MG tablet oxybutynin chloride 5 mg tablet     • vitamin D (ERGOCALCIFEROL) 53672 UNITS capsule capsule Take 50,000 Units by mouth 1 (one) time per week.          Objective     Vital Signs:   /74 (BP Location: Left arm, Patient Position: Sitting, Cuff Size: Adult)   Pulse 70   Ht 167.6 cm (65.98\")   Wt 86 kg (189 lb 9.6 oz)   SpO2 99%   BMI 30.62 kg/m²   Wt Readings from Last 3 Encounters:   09/20/22 86 kg (189 lb 9.6 oz)   09/20/21 89 kg (196 lb 3.2 oz)   08/31/20 90.9 kg (200 lb 6.4 oz)         Vitals reviewed.   Constitutional:       Appearance: Well-developed.   Eyes:      General: No scleral icterus.  Neck:      Vascular: No JVD.   Pulmonary:      Effort: Pulmonary effort is normal.      Breath sounds: Normal breath sounds.   Cardiovascular:      Normal rate. Regular rhythm.      No gallop.   Pulses:     Intact distal pulses.   Edema:     Peripheral edema absent.   Skin:     General: Skin is warm and dry.   Neurological:      Mental Status: Alert and oriented to person, place, and time.         Result Review  Data Reviewed:  The following data was reviewed by: JOSEPH Gómez on 09/20/2022 9/12/22 labs reviewed from [facility] (to be scanned)  Scan on 9/8/2021 by Massiel Mirza APRN: TSH/CBC/CMP/LIP/PSA/VITD/VIB12/ZION&DELGADO/9-8-2021               ECG 12 Lead    Date/Time: 9/20/2022 1:18 PM  Performed by: Massiel Mirza APRN  Authorized by: Massiel Mirza APRN "   Comparison: compared with previous ECG from 9/20/2021  Comparison to previous ECG: L BBB has returned  Rhythm: sinus rhythm  Rate: normal  BPM: 62  Conduction: left bundle branch block  Conduction comments: Hx intermittent    Clinical impression: abnormal EKG               Assessment and Plan   Problem List Items Addressed This Visit        Allergies and Adverse Reactions    Statin intolerance (Chronic)       Cardiac and Vasculature    Atherosclerosis of native coronary artery of native heart - Primary (Chronic)    Current Assessment & Plan     Remains stable without angina. Continue present therapy. Call if worsens. Encouraged lifestyle modifications.         Relevant Orders    ECG 12 Lead    Hyperlipidemia (Chronic)    Current Assessment & Plan     Still not quite controlled with Zetia and Welchol. Hx statin intolerance. Encouraged healthy diet and routine aerobic exercise. His is interested in Leqvio since LDL remains greater than 70.         Hypertension (Chronic)    Current Assessment & Plan     Reportedly well controlled per home readings with medications. Continue present therapy. Monitor BP and call if persistently greater than 130/80 per home readings.            Relevant Orders    ECG 12 Lead       Endocrine and Metabolic    Non morbid obesity due to excess calories (Chronic)    Current Assessment & Plan     Patient's (Body mass index is 30.62 kg/m².) indicates that they are obese (BMI >30) with health conditions that include hypertension, coronary heart disease and dyslipidemias . Weight is improving with lifestyle modifications. BMI is is above average; BMI management plan is completed. We discussed portion control and increasing exercise.              Patient was given instructions and counseling regarding his condition or for health maintenance advice. Please see specific information pulled into the AVS if appropriate.    Follow Up :   Return in about 1 year (around 9/20/2023) for Recheck.              Massiel Mirza, JOSEPH, ACNP-BC, CHFN-BC

## 2022-11-10 DIAGNOSIS — E78.2 MIXED HYPERLIPIDEMIA: Chronic | ICD-10-CM

## 2022-11-10 DIAGNOSIS — Z95.5 PRESENCE OF STENT IN CORONARY ARTERY: ICD-10-CM

## 2022-11-10 DIAGNOSIS — I25.10 ATHEROSCLEROSIS OF NATIVE CORONARY ARTERY OF NATIVE HEART WITHOUT ANGINA PECTORIS: Primary | Chronic | ICD-10-CM

## 2022-11-10 DIAGNOSIS — Z95.5 S/P CORONARY ARTERY STENT PLACEMENT: Chronic | ICD-10-CM

## 2022-11-10 DIAGNOSIS — Z78.9 STATIN INTOLERANCE: Chronic | ICD-10-CM

## 2022-11-16 ENCOUNTER — TELEPHONE (OUTPATIENT)
Dept: CARDIOLOGY | Facility: CLINIC | Age: 72
End: 2022-11-16

## 2022-11-16 NOTE — TELEPHONE ENCOUNTER
Patient is scheduled for initial dose of Leqvio on 12-1-2022 at [facility].  Patient has been notified and verified appt.

## 2022-12-01 ENCOUNTER — INFUSION (OUTPATIENT)
Dept: ONCOLOGY | Facility: HOSPITAL | Age: 72
End: 2022-12-01

## 2022-12-01 VITALS
HEIGHT: 66 IN | WEIGHT: 193 LBS | HEART RATE: 70 BPM | DIASTOLIC BLOOD PRESSURE: 68 MMHG | OXYGEN SATURATION: 98 % | SYSTOLIC BLOOD PRESSURE: 121 MMHG | BODY MASS INDEX: 31.02 KG/M2 | RESPIRATION RATE: 18 BRPM | TEMPERATURE: 97.3 F

## 2022-12-01 DIAGNOSIS — Z78.9 STATIN INTOLERANCE: Primary | ICD-10-CM

## 2022-12-01 DIAGNOSIS — E78.2 MIXED HYPERLIPIDEMIA: ICD-10-CM

## 2022-12-01 PROCEDURE — 96372 THER/PROPH/DIAG INJ SC/IM: CPT

## 2022-12-01 PROCEDURE — 25010000002 INCLISIRAN SODIUM 284 MG/1.5ML SOLUTION PREFILLED SYRINGE: Performed by: NURSE PRACTITIONER

## 2022-12-01 RX ADMIN — INCLISIRAN 284 MG: 284 INJECTION, SOLUTION SUBCUTANEOUS at 13:41

## 2022-12-22 ENCOUNTER — DOCUMENTATION (OUTPATIENT)
Dept: CARDIOLOGY | Facility: CLINIC | Age: 72
End: 2022-12-22

## 2023-01-27 ENCOUNTER — TELEPHONE (OUTPATIENT)
Dept: CARDIOLOGY | Facility: CLINIC | Age: 73
End: 2023-01-27
Payer: MEDICARE

## 2023-01-27 DIAGNOSIS — Z95.5 S/P CORONARY ARTERY STENT PLACEMENT: Chronic | ICD-10-CM

## 2023-01-27 DIAGNOSIS — Z02.4 ENCOUNTER FOR COMMERCIAL DRIVING LICENSE (CDL) EXAM: ICD-10-CM

## 2023-01-27 DIAGNOSIS — R06.02 SHORTNESS OF BREATH: ICD-10-CM

## 2023-01-27 DIAGNOSIS — I44.7 LBBB (LEFT BUNDLE BRANCH BLOCK): ICD-10-CM

## 2023-01-27 DIAGNOSIS — I25.10 ATHEROSCLEROSIS OF NATIVE CORONARY ARTERY OF NATIVE HEART WITHOUT ANGINA PECTORIS: Primary | Chronic | ICD-10-CM

## 2023-01-27 NOTE — TELEPHONE ENCOUNTER
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 Patient states he has a bundle brach block so he was not sure which stress test you would recommended him having.    Patient says he can have done as soon as he can at [facility].     Thanks

## 2023-02-14 ENCOUNTER — OUTSIDE FACILITY SERVICE (OUTPATIENT)
Dept: CARDIOLOGY | Facility: CLINIC | Age: 73
End: 2023-02-14
Payer: MEDICARE

## 2023-02-14 PROCEDURE — 93018 CV STRESS TEST I&R ONLY: CPT | Performed by: INTERNAL MEDICINE

## 2023-02-15 DIAGNOSIS — R06.02 SHORTNESS OF BREATH: ICD-10-CM

## 2023-02-15 DIAGNOSIS — I44.7 LBBB (LEFT BUNDLE BRANCH BLOCK): ICD-10-CM

## 2023-02-15 DIAGNOSIS — Z02.4 ENCOUNTER FOR COMMERCIAL DRIVING LICENSE (CDL) EXAM: ICD-10-CM

## 2023-02-15 DIAGNOSIS — Z95.5 S/P CORONARY ARTERY STENT PLACEMENT: Chronic | ICD-10-CM

## 2023-02-15 DIAGNOSIS — I25.10 ATHEROSCLEROSIS OF NATIVE CORONARY ARTERY OF NATIVE HEART WITHOUT ANGINA PECTORIS: Chronic | ICD-10-CM

## 2023-02-15 NOTE — PROGRESS NOTES
Notified patient of abnormal nuclear stress but similar to false positive in 2018. He isn't having any symptoms. 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 He verbalized understanding. He had Leqvio 12/1/22 and is salomon'd for 2nd injection 3/1/23 at [facility] infusion center and then every 6 months. He will get labs in May at [facility].

## 2023-03-01 ENCOUNTER — INFUSION (OUTPATIENT)
Dept: ONCOLOGY | Facility: HOSPITAL | Age: 73
End: 2023-03-01
Payer: MEDICARE

## 2023-03-01 VITALS
HEART RATE: 66 BPM | SYSTOLIC BLOOD PRESSURE: 110 MMHG | OXYGEN SATURATION: 98 % | RESPIRATION RATE: 18 BRPM | HEIGHT: 66 IN | TEMPERATURE: 97.7 F | DIASTOLIC BLOOD PRESSURE: 64 MMHG | WEIGHT: 191 LBS | BODY MASS INDEX: 30.7 KG/M2

## 2023-03-01 DIAGNOSIS — E78.2 MIXED HYPERLIPIDEMIA: ICD-10-CM

## 2023-03-01 DIAGNOSIS — Z78.9 STATIN INTOLERANCE: Primary | ICD-10-CM

## 2023-03-01 PROCEDURE — 96372 THER/PROPH/DIAG INJ SC/IM: CPT

## 2023-03-01 PROCEDURE — 25010000002 INCLISIRAN SODIUM 284 MG/1.5ML SOLUTION PREFILLED SYRINGE: Performed by: NURSE PRACTITIONER

## 2023-03-01 RX ADMIN — INCLISIRAN 284 MG: 284 INJECTION, SOLUTION SUBCUTANEOUS at 13:06

## 2023-04-28 ENCOUNTER — CLINICAL SUPPORT (OUTPATIENT)
Dept: CARDIOLOGY | Facility: CLINIC | Age: 73
End: 2023-04-28
Payer: MEDICARE

## 2023-04-28 DIAGNOSIS — Z95.5 S/P CORONARY ARTERY STENT PLACEMENT: Chronic | ICD-10-CM

## 2023-04-28 DIAGNOSIS — I44.7 LEFT BUNDLE BRANCH BLOCK: ICD-10-CM

## 2023-04-28 DIAGNOSIS — I25.10 ATHEROSCLEROSIS OF NATIVE CORONARY ARTERY OF NATIVE HEART WITHOUT ANGINA PECTORIS: Primary | Chronic | ICD-10-CM

## 2023-04-28 PROCEDURE — 93000 ELECTROCARDIOGRAM COMPLETE: CPT | Performed by: NURSE PRACTITIONER

## 2023-04-28 NOTE — PROGRESS NOTES
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 No significant change.      JOSEPH Gallardo, ACNP-BC, CHFN-BC

## 2023-05-18 RX ORDER — ACEBUTOLOL HYDROCHLORIDE 200 MG/1
200 CAPSULE ORAL DAILY
COMMUNITY

## 2023-05-18 RX ORDER — CETIRIZINE HYDROCHLORIDE 10 MG/1
10 TABLET ORAL DAILY
COMMUNITY

## 2023-05-18 RX ORDER — LEVOTHYROXINE SODIUM 112 UG/1
112 TABLET ORAL DAILY
COMMUNITY

## 2023-05-18 RX ORDER — FINASTERIDE 5 MG/1
5 TABLET, FILM COATED ORAL DAILY
COMMUNITY

## 2023-05-18 RX ORDER — UBIDECARENONE 100 MG
100 CAPSULE ORAL DAILY
COMMUNITY

## 2023-05-18 RX ORDER — EZETIMIBE 10 MG/1
10 TABLET ORAL DAILY
COMMUNITY

## 2023-05-18 RX ORDER — ONDANSETRON 8 MG/1
TABLET, ORALLY DISINTEGRATING ORAL
COMMUNITY
End: 2023-05-24 | Stop reason: ALTCHOICE

## 2023-05-18 RX ORDER — MECLIZINE HCL 12.5 MG/1
12.5 TABLET ORAL 3 TIMES DAILY PRN
COMMUNITY

## 2023-05-18 RX ORDER — COLESEVELAM 180 1/1
625 TABLET ORAL DAILY
COMMUNITY

## 2023-05-18 RX ORDER — FLUOXETINE 10 MG/1
10 CAPSULE ORAL DAILY
COMMUNITY

## 2023-05-18 RX ORDER — ALFUZOSIN HYDROCHLORIDE 10 MG/1
TABLET, EXTENDED RELEASE ORAL
COMMUNITY

## 2023-05-24 ENCOUNTER — OFFICE VISIT (OUTPATIENT)
Dept: GASTROENTEROLOGY | Age: 73
End: 2023-05-24
Payer: MEDICARE

## 2023-05-24 ENCOUNTER — TELEPHONE (OUTPATIENT)
Dept: GASTROENTEROLOGY | Age: 73
End: 2023-05-24

## 2023-05-24 VITALS
HEART RATE: 64 BPM | HEIGHT: 66 IN | BODY MASS INDEX: 30.37 KG/M2 | DIASTOLIC BLOOD PRESSURE: 80 MMHG | WEIGHT: 189 LBS | OXYGEN SATURATION: 98 % | SYSTOLIC BLOOD PRESSURE: 115 MMHG

## 2023-05-24 DIAGNOSIS — R19.7 DIARRHEA, UNSPECIFIED TYPE: ICD-10-CM

## 2023-05-24 DIAGNOSIS — R10.9 ABDOMINAL CRAMPING: ICD-10-CM

## 2023-05-24 DIAGNOSIS — R19.7 DIARRHEA, UNSPECIFIED TYPE: Primary | ICD-10-CM

## 2023-05-24 DIAGNOSIS — K52.9 POSTPRANDIAL DIARRHEA: ICD-10-CM

## 2023-05-24 LAB
ADV 40+41 DNA STL QL NAA+NON-PROBE: NOT DETECTED
C CAYETANENSIS DNA STL QL NAA+NON-PROBE: NOT DETECTED
C COLI+JEJ+UPSA DNA STL QL NAA+NON-PROBE: NOT DETECTED
C DIF TOX TCDA+TCDB STL QL NAA+NON-PROBE: NOT DETECTED
CRYPTOSP DNA STL QL NAA+NON-PROBE: NOT DETECTED
E HISTOLYT DNA STL QL NAA+NON-PROBE: NOT DETECTED
EAEC PAA PLAS AGGR+AATA ST NAA+NON-PRB: NOT DETECTED
EC STX1+STX2 GENES STL QL NAA+NON-PROBE: NOT DETECTED
EPEC EAE GENE STL QL NAA+NON-PROBE: NOT DETECTED
ETEC LTA+ST1A+ST1B TOX ST NAA+NON-PROBE: NOT DETECTED
G LAMBLIA DNA STL QL NAA+NON-PROBE: NOT DETECTED
GI PATH DNA+RNA PNL STL NAA+NON-PROBE: NOT DETECTED
NOROVIRUS GI+II RNA STL QL NAA+NON-PROBE: NOT DETECTED
P SHIGELLOIDES DNA STL QL NAA+NON-PROBE: NOT DETECTED
RVA RNA STL QL NAA+NON-PROBE: NOT DETECTED
S ENT+BONG DNA STL QL NAA+NON-PROBE: NOT DETECTED
SAPO I+II+IV+V RNA STL QL NAA+NON-PROBE: NOT DETECTED
SHIGELLA SP+EIEC IPAH ST NAA+NON-PROBE: NOT DETECTED
V CHOL+PARA+VUL DNA STL QL NAA+NON-PROBE: NOT DETECTED
V CHOLERAE DNA STL QL NAA+NON-PROBE: NOT DETECTED
Y ENTEROCOL DNA STL QL NAA+NON-PROBE: NOT DETECTED

## 2023-05-24 PROCEDURE — 1123F ACP DISCUSS/DSCN MKR DOCD: CPT | Performed by: NURSE PRACTITIONER

## 2023-05-24 PROCEDURE — 3017F COLORECTAL CA SCREEN DOC REV: CPT | Performed by: NURSE PRACTITIONER

## 2023-05-24 PROCEDURE — 99204 OFFICE O/P NEW MOD 45 MIN: CPT | Performed by: NURSE PRACTITIONER

## 2023-05-24 PROCEDURE — G8419 CALC BMI OUT NRM PARAM NOF/U: HCPCS | Performed by: NURSE PRACTITIONER

## 2023-05-24 PROCEDURE — 4004F PT TOBACCO SCREEN RCVD TLK: CPT | Performed by: NURSE PRACTITIONER

## 2023-05-24 PROCEDURE — G8428 CUR MEDS NOT DOCUMENT: HCPCS | Performed by: NURSE PRACTITIONER

## 2023-05-24 RX ORDER — VITAMIN B COMPLEX
1 CAPSULE ORAL DAILY
COMMUNITY

## 2023-05-24 RX ORDER — ASPIRIN 81 MG/1
81 TABLET ORAL DAILY
COMMUNITY

## 2023-05-24 RX ORDER — LANOLIN ALCOHOL/MO/W.PET/CERES
1000 CREAM (GRAM) TOPICAL DAILY
COMMUNITY

## 2023-05-24 ASSESSMENT — ENCOUNTER SYMPTOMS
ANAL BLEEDING: 0
RECTAL PAIN: 0
CHOKING: 0
BLOOD IN STOOL: 0
SHORTNESS OF BREATH: 0
DIARRHEA: 1
TROUBLE SWALLOWING: 0
NAUSEA: 0
CONSTIPATION: 0
VOMITING: 0
ABDOMINAL DISTENTION: 0
COUGH: 0
ABDOMINAL PAIN: 1

## 2023-05-24 NOTE — PATIENT INSTRUCTIONS
Colonoscopy: Before Your Procedure  What is a colonoscopy? A colonoscopy is a test that lets a doctor look inside your colon. The doctor uses a thin, lighted tube called a colonoscope to look for problems. These include small growths called polyps, cancer, or bleeding. During the test, the doctor can take samples of tissue that can be checked for cancer or other problems. This is called a biopsy. The doctor can also take out polyps. Before the test, you will need to stop eating solid foods. You also will be given instructions on how to clean out your colon. This helps your doctor be able to see inside your colon during the test.  How do you prepare for the procedure? Procedures can be stressful. This information will help you understand what you can expect. And it will help you safely prepare for your procedure. Preparing for the procedure    Be sure you have someone to take you home. Anesthesia and pain medicine will make it unsafe for you to drive or get home on your own. Understand exactly what procedure is planned, along with the risks, benefits, and other options. Tell your doctor ALL the medicines, vitamins, supplements, and herbal remedies you take. Some may increase the risk of problems during your procedure. Your doctor will tell you if you should stop taking any of them before the procedure and how soon to do it. If you take a medicine that prevents blood clots, your doctor may tell you to stop taking it before your procedure. Or your doctor may tell you to keep taking it. (These medicines include aspirin and other blood thinners.) Make sure that you understand exactly what your doctor wants you to do. Make sure your doctor and the hospital have a copy of your advance directive. If you don't have one, you may want to prepare one. It lets others know your health care wishes. It's a good thing to have before any type of surgery or procedure.    Before the procedure    Follow your

## 2023-05-24 NOTE — PROGRESS NOTES
Subjective:     Patient ID: Roxana Sheth is a 81 y.o. male  PCP: KATINA Menjivar  Referring Provider: KATINA Melton    HPI  Patient presents to the office today with the following complaints: New Patient and Diarrhea      Patient seen in the office today with complaints of postprandial diarrhea. 5-6 bowel movements per day   This has been getting worse over the past few months  Abd cramping when he needs to have a bowel movement   Denies blood in stool, but does notice mucus     Assessment:     1. Diarrhea, unspecified type  -     Gastrointestinal Panel, Molecular; Future  2. Abdominal cramping  -     Gastrointestinal Panel, Molecular; Future  3. Postprandial diarrhea           Plan:   Schedule Colonoscopy  Instruct on bowel prep. Nothing to eat or drink after midnight the day of the exam.  Unable to drive for 24 hours after the procedure. No aspirin or nonsteroidal anti-inflammatories for 5 days before procedure. I have discussed the benefits, alternatives, and risks (including bleeding, perforation and death)  for pursuing Endoscopy (EGD/Colonscopy/EUS/ERCP) with the patient and they are willing to continue. We also discussed the need for anesthesia, IV access, proper dietary changes, medication changes if necessary, and need for bowel prep (if ordered) prior to their Endoscopic procedure. They are aware they must have someone accompany them to their scheduled procedure to drive them home - they agree to the above and are willing to continue. Orders  Orders Placed This Encounter   Procedures    Gastrointestinal Panel, Molecular     Standing Status:   Future     Standing Expiration Date:   5/24/2024     Medications  No orders of the defined types were placed in this encounter.         Patient History:     Past Medical History:   Diagnosis Date    CAD (coronary artery disease)     Hypercholesterolemia     Hypertension     Hypothyroidism     Psoriasis     Urinary frequency     sees

## 2023-06-08 ENCOUNTER — HOSPITAL ENCOUNTER (OUTPATIENT)
Dept: OPERATING ROOM | Age: 73
Setting detail: OUTPATIENT SURGERY
Discharge: HOME | End: 2023-06-09
Attending: INTERNAL MEDICINE

## 2023-06-08 RX ORDER — SODIUM CHLORIDE, SODIUM LACTATE, POTASSIUM CHLORIDE, CALCIUM CHLORIDE 600; 310; 30; 20 MG/100ML; MG/100ML; MG/100ML; MG/100ML
INJECTION, SOLUTION INTRAVENOUS CONTINUOUS
Status: DISCONTINUED | OUTPATIENT
Start: 2023-06-08 | End: 2023-06-13 | Stop reason: HOSPADM

## 2023-06-13 ENCOUNTER — HOSPITAL ENCOUNTER (OUTPATIENT)
Age: 73
Setting detail: OUTPATIENT SURGERY
Discharge: HOME | End: 2023-06-13
Attending: INTERNAL MEDICINE | Admitting: INTERNAL MEDICINE
Payer: MEDICARE

## 2023-06-13 ENCOUNTER — APPOINTMENT (OUTPATIENT)
Dept: OPERATING ROOM | Age: 73
End: 2023-06-13
Attending: INTERNAL MEDICINE

## 2023-06-13 VITALS
TEMPERATURE: 97.9 F | DIASTOLIC BLOOD PRESSURE: 76 MMHG | BODY MASS INDEX: 30.53 KG/M2 | HEIGHT: 66 IN | SYSTOLIC BLOOD PRESSURE: 134 MMHG | RESPIRATION RATE: 16 BRPM | HEART RATE: 58 BPM | OXYGEN SATURATION: 100 % | WEIGHT: 190 LBS

## 2023-06-13 PROCEDURE — 45380 COLONOSCOPY AND BIOPSY: CPT | Performed by: INTERNAL MEDICINE

## 2023-06-13 PROCEDURE — 45380 COLONOSCOPY AND BIOPSY: CPT

## 2023-06-13 RX ORDER — ONDANSETRON 2 MG/ML
4 INJECTION INTRAMUSCULAR; INTRAVENOUS
Status: CANCELLED | OUTPATIENT
Start: 2023-06-13 | End: 2023-06-14

## 2023-06-13 RX ADMIN — SODIUM CHLORIDE, SODIUM LACTATE, POTASSIUM CHLORIDE, CALCIUM CHLORIDE: 600; 310; 30; 20 INJECTION, SOLUTION INTRAVENOUS at 12:36

## 2023-06-13 NOTE — H&P
Patient Name: Tia Buerger  : 1950  MRN: [id number]  DATE: 23    Allergies: No Known Allergies     ENDOSCOPY  History and Physical    Procedure:    [x] Diagnostic Colonoscopy       [] Screening Colonoscopy  [] EGD      [] ERCP      [] EUS       [] Other    [x] Previous office notes/History and Physical reviewed from the patients chart. Please see EMR for further details of HPI. I have examined the patient's status immediately prior to the procedure and:      Indications/HPI:  1. Diarrhea, unspecified type    2. Abdominal cramping    3. Postprandial diarrhea      []Abdominal Pain   []Cancer- GI/Lung     []Fhx of colon CA/polyps  []History of Polyps  []Barretts            []Melena  []Abnormal Imaging              []Dysphagia              []Persistent Pneumonia   []Anemia                            []Food Impaction        []History of Polyps  [] GI Bleed             []Pulmonary nodule/Mass   []Change in bowel habits []Heartburn/Reflux  []Rectal Bleed (BRBPR)  []Chest Pain - Non Cardiac []Heme (+) Stool []Ulcers  []Constipation  []Hemoptysis  []Varices  []Diarrhea  []Hypoxemia    []Nausea/Vomiting   []Screening   []Crohns/Colitis  []Other:     Anesthesia:   [x] MAC [] Moderate Sedation   [] General   [] None     ROS: 12 pt Review of Symptoms was negative unless mentioned above    Medications:   Prior to Admission medications    Medication Sig Start Date End Date Taking?  Authorizing Provider   aspirin 81 MG EC tablet Take 1 tablet by mouth daily    Historical Provider, MD   b complex vitamins capsule Take 1 capsule by mouth daily    Historical Provider, MD   vitamin B-12 (CYANOCOBALAMIN) 1000 MCG tablet Take 1 tablet by mouth daily    Historical Provider, MD   meclizine (ANTIVERT) 12.5 MG tablet Take 1 tablet by mouth 3 times daily as needed    Historical Provider, MD   acebutolol (SECTRAL) 200 MG capsule Take 1 capsule by mouth daily    Historical Provider, MD   ezetimibe (ZETIA) 10 MG tablet Take 1

## 2023-06-13 NOTE — OP NOTE
Patient: Carlos Iglesias : 1950  Med Rec#: [id number] Acc#: [id number]   Primary Care Provider KATINA Amin    Date of Procedure:  2023    Endoscopist: Jose Roberto Winkler MD, MD    Referring Provider: KATINA Amin,     Operation Performed: Colonoscopy up to the terminal ileum with random colon cold biopsies    Indications:   1. Diarrhea, unspecified type  2. Abdominal cramping  3. Postprandial diarrhea  4. Unintentional weight loss    Anesthesia:  Sedation was administered by anesthesia who monitored the patient during the procedure. I met with Carlos Iglesias prior to procedure. We discussed the procedure itself, and I have discussed the risks of endoscopy (including-- but not limited to-- pain, discomfort, bleeding potentially requiring second endoscopic procedure and/or blood transfusion, organ perforation requiring operative repair, damage to organs near the colon, infection, aspiration, cardiopulmonary/allergic reaction), benefits, indications to endoscopy. Additionally, we discussed options other than colonoscopy. The patient expressed understanding. All questions answered. The patient decided to proceed with the procedure. Signed informed consent was placed on the chart. Blood Loss: minimal    Withdrawal time: More than 6 minutes  Bowel Prep: Fair to poor (likely due to noncompliance with instructions as per the patient's wife who reports patient completed only a partial prep last night) with thick solid and semisolid stool scattered in segments throughout the colon obscuring the underlying mucosa. Lesions including polyps may have been missed. Exam was inadequate for a more thorough diagnostic and for colon cancer screening purposes    Complications: no immediate complications    DESCRIPTION OF PROCEDURE:     A time out was performed. After written informed consent was obtained, the patient was placed in the left lateral position.      The perianal area was

## 2023-06-13 NOTE — DISCHARGE INSTRUCTIONS
POST-OP ORDERS: ENDOSCOPY & COLONOSCOPY:    1. Rest today. 2. DO NOT eat or drink until wide awake; eat your usual diet today in moderate amount only. 3. DO NOT drive today. 4. Call physician if you have severe pain, vomiting, fever, rectal bleeding or black bowel movements. 5.  If a biopsy was taken or a polyp removed, you should expect to hear results in about 21 days. If you have heard nothing from your physician by then, call the office for results  6. Discharge home when patient awake, vitals signs stable and tolerating liquids. 7. Call with questions or concerns [phone].     1. Repeat colonoscopy with an EGD exam (since patient also has unintentional weight loss): pending pathology -in 3 to 4 weeks with a strict 2-day clear liquid diet and a 2-day prep using Plenvu or a similar solution along with Zofran 2 mg p.o. every 4 hours as needed  2. Await biopsy results-you will receive a letter with your results within 7-10 days    - Resume previous meds and diet  - GI clinic f/u 6 weeks with Ms. He    NSAIDS Non-steroidal Anti-Inflammatory  You have been directed by your physician to avoid any NSAID's; the following medications are a list of those to avoid. If you think that you are taking any NSAID's notify your physician.    Over The Counter  Advil                      Motrin  Nuprin                   Ibuprofen  Midol                     Aleve  Naproxen              Orudis  Aspirin                   Nancy-Pyatt  Prescriptions and Generics  Cataflam              Relafen  Voltaren               Clinoril  Indocin                 Naproxen  Arthrotec              Lodine  Daypro                 Nalfon  Toradol                Ansaid  Feldene               Meclofenamate  Fenoprofen          Ponstel  Mobic                   Celebrex  Vioxx       - Keep scheduled f/u appts with other MDs     - NO ASA/NSAIDs x 2 weeks

## 2023-06-20 RX ORDER — BUDESONIDE 3 MG/1
9 CAPSULE, COATED PELLETS ORAL EVERY MORNING
Qty: 90 CAPSULE | Refills: 3 | Status: SHIPPED | OUTPATIENT
Start: 2023-06-20 | End: 2023-10-18

## 2023-06-22 ENCOUNTER — TELEPHONE (OUTPATIENT)
Dept: GASTROENTEROLOGY | Age: 73
End: 2023-06-22

## 2023-06-22 NOTE — TELEPHONE ENCOUNTER
06- Received notification from [facility] denial for budesonide. Due to patient did not show that he met any of the requirements. Pas has had clinical relapse after cessation of treatment therapy.        Faxed appeal to [facility]

## 2023-07-18 ENCOUNTER — ANESTHESIA EVENT (OUTPATIENT)
Dept: OPERATING ROOM | Age: 73
End: 2023-07-18

## 2023-07-19 ENCOUNTER — HOSPITAL ENCOUNTER (OUTPATIENT)
Age: 73
Setting detail: OUTPATIENT SURGERY
Discharge: HOME | End: 2023-07-19
Attending: INTERNAL MEDICINE | Admitting: INTERNAL MEDICINE
Payer: MEDICARE

## 2023-07-19 ENCOUNTER — APPOINTMENT (OUTPATIENT)
Dept: OPERATING ROOM | Age: 73
End: 2023-07-19
Attending: INTERNAL MEDICINE

## 2023-07-19 ENCOUNTER — ANESTHESIA (OUTPATIENT)
Dept: OPERATING ROOM | Age: 73
End: 2023-07-19

## 2023-07-19 ENCOUNTER — HOSPITAL ENCOUNTER (OUTPATIENT)
Age: 73
Setting detail: SPECIMEN
Discharge: HOME | End: 2023-07-19
Payer: MEDICARE

## 2023-07-19 VITALS
SYSTOLIC BLOOD PRESSURE: 127 MMHG | WEIGHT: 180 LBS | DIASTOLIC BLOOD PRESSURE: 83 MMHG | OXYGEN SATURATION: 99 % | BODY MASS INDEX: 28.93 KG/M2 | HEIGHT: 66 IN | TEMPERATURE: 97.5 F | HEART RATE: 65 BPM | RESPIRATION RATE: 16 BRPM

## 2023-07-19 PROCEDURE — 45380 COLONOSCOPY AND BIOPSY: CPT | Performed by: INTERNAL MEDICINE

## 2023-07-19 PROCEDURE — 45380 COLONOSCOPY AND BIOPSY: CPT

## 2023-07-19 PROCEDURE — 43239 EGD BIOPSY SINGLE/MULTIPLE: CPT | Performed by: INTERNAL MEDICINE

## 2023-07-19 PROCEDURE — 43239 EGD BIOPSY SINGLE/MULTIPLE: CPT

## 2023-07-19 PROCEDURE — 88305 TISSUE EXAM BY PATHOLOGIST: CPT

## 2023-07-19 PROCEDURE — 45384 COLONOSCOPY W/LESION REMOVAL: CPT

## 2023-07-19 PROCEDURE — G8907 PT DOC NO EVENTS ON DISCHARG: HCPCS

## 2023-07-19 PROCEDURE — G8918 PT W/O PREOP ORDER IV AB PRO: HCPCS

## 2023-07-19 PROCEDURE — 45384 COLONOSCOPY W/LESION REMOVAL: CPT | Performed by: INTERNAL MEDICINE

## 2023-07-19 RX ORDER — LIDOCAINE HYDROCHLORIDE 10 MG/ML
INJECTION, SOLUTION EPIDURAL; INFILTRATION; INTRACAUDAL; PERINEURAL PRN
Status: DISCONTINUED | OUTPATIENT
Start: 2023-07-19 | End: 2023-07-19 | Stop reason: SDUPTHER

## 2023-07-19 RX ORDER — SODIUM CHLORIDE, SODIUM LACTATE, POTASSIUM CHLORIDE, CALCIUM CHLORIDE 600; 310; 30; 20 MG/100ML; MG/100ML; MG/100ML; MG/100ML
INJECTION, SOLUTION INTRAVENOUS CONTINUOUS
Status: DISCONTINUED | OUTPATIENT
Start: 2023-07-19 | End: 2023-07-19 | Stop reason: HOSPADM

## 2023-07-19 RX ORDER — PROPOFOL 10 MG/ML
INJECTION, EMULSION INTRAVENOUS PRN
Status: DISCONTINUED | OUTPATIENT
Start: 2023-07-19 | End: 2023-07-19 | Stop reason: SDUPTHER

## 2023-07-19 RX ADMIN — PROPOFOL 400 MG: 10 INJECTION, EMULSION INTRAVENOUS at 11:20

## 2023-07-19 RX ADMIN — SODIUM CHLORIDE, SODIUM LACTATE, POTASSIUM CHLORIDE, CALCIUM CHLORIDE: 600; 310; 30; 20 INJECTION, SOLUTION INTRAVENOUS at 09:43

## 2023-07-19 RX ADMIN — LIDOCAINE HYDROCHLORIDE 30 MG: 10 INJECTION, SOLUTION EPIDURAL; INFILTRATION; INTRACAUDAL; PERINEURAL at 11:20

## 2023-07-19 ASSESSMENT — PAIN SCALES - GENERAL: PAINLEVEL_OUTOF10: 0

## 2023-07-19 NOTE — H&P
Patient Name: Sriram Gibson  : 1950  MRN: [id number]  DATE: 23    Allergies: No Known Allergies     ENDOSCOPY  History and Physical    Procedure:    [x] Diagnostic Colonoscopy       [] Screening Colonoscopy  [] EGD      [] ERCP      [] EUS       [] Other    [x] Previous office notes/History and Physical reviewed from the patients chart. Please see EMR for further details of HPI. I have examined the patient's status immediately prior to the procedure and:      Indications/HPI:      1. Diarrhea, unspecified type  2. Abdominal cramping  3. Postprandial diarrhea  4. Unintentional weight loss  5. Recent colonoscopy attempt was suboptimal due to poor prep requiring this repeat exam after a more thorough prep    []Abdominal Pain   []Cancer- GI/Lung     []Fhx of colon CA/polyps  []History of Polyps  []Barretts            []Melena  []Abnormal Imaging              []Dysphagia              []Persistent Pneumonia   []Anemia                            []Food Impaction        []History of Polyps  [] GI Bleed             []Pulmonary nodule/Mass   []Change in bowel habits []Heartburn/Reflux  []Rectal Bleed (BRBPR)  []Chest Pain - Non Cardiac []Heme (+) Stool []Ulcers  []Constipation  []Hemoptysis  []Varices  []Diarrhea  []Hypoxemia    []Nausea/Vomiting   []Screening   []Crohns/Colitis  []Other:     Anesthesia:   [x] MAC [] Moderate Sedation   [] General   [] None     ROS: 12 pt Review of Symptoms was negative unless mentioned above    Medications:   Prior to Admission medications    Medication Sig Start Date End Date Taking?  Authorizing Provider   budesonide (ENTOCORT EC) 3 MG extended release capsule Take 3 capsules by mouth every morning  Patient not taking: Reported on 2023 6/20/23 10/18/23  KATINA Romero   aspirin 81 MG EC tablet Take 1 tablet by mouth daily    Historical Provider, MD   b complex vitamins capsule Take 1 capsule by mouth daily  Patient not taking: Reported on 2023 Historical Provider, MD   vitamin B-12 (CYANOCOBALAMIN) 1000 MCG tablet Take 1 tablet by mouth daily    Historical Provider, MD   meclizine (ANTIVERT) 12.5 MG tablet Take 1 tablet by mouth 3 times daily as needed    Historical Provider, MD   acebutolol (SECTRAL) 200 MG capsule Take 1 capsule by mouth daily    Historical Provider, MD   ezetimibe (ZETIA) 10 MG tablet Take 1 tablet by mouth daily    Historical Provider, MD   FLUoxetine (PROZAC) 10 MG capsule Take 1 capsule by mouth daily    Historical Provider, MD   colesevelam (WELCHOL) 625 MG tablet Take 1 tablet by mouth daily    Historical Provider, MD   levothyroxine (SYNTHROID) 112 MCG tablet Take 1 tablet by mouth daily    Historical Provider, MD   cetirizine (ZYRTEC) 10 MG tablet Take 1 tablet by mouth daily    Historical Provider, MD   finasteride (PROSCAR) 5 MG tablet Take 1 tablet by mouth daily    Historical Provider, MD   coenzyme Q10 100 MG CAPS capsule Take 1 capsule by mouth daily    Historical Provider, MD   alfuzosin (UROXATRAL) 10 MG extended release tablet alfuzosin ER 10 mg tablet,extended release 24 hr    Historical Provider, MD   Ergocalciferol (VITAMIN D2 PO) Take 50,000 Units by mouth every 7 days    Historical Provider, MD       Past Medical History:  Past Medical History:   Diagnosis Date    CAD (coronary artery disease)     Hypercholesterolemia     Hypertension     Hypothyroidism     Psoriasis     Urinary frequency     sees Dr Ashley Masters- Urololgy    Vertigo     Vitamin B12 deficiency     Vitamin D deficiency        Past Surgical History:  Past Surgical History:   Procedure Laterality Date    APPENDECTOMY      age 23    CARDIAC SURGERY Left     Stent in left circumflex at 95% obstruction- Dr Darci Lundy  2008    COLONOSCOPY  2013    Dr Jony Marcelo- normal per pt    COLONOSCOPY N/A 06/13/2023    Dr Valerio Ohm, (+) Lymphocytic colitis,int hem Gr 1 wo bleeding,  sub prep poor prep repeat in 3-4 weeks    FINGER TRIGGER

## 2023-07-19 NOTE — ANESTHESIA PRE PROCEDURE
POCGLU, POCNA, POCK, POCCL, POCBUN, POCHEMO, POCHCT in the last 72 hours. Coags: No results found for: PROTIME, INR, APTT    HCG (If Applicable): No results found for: PREGTESTUR, PREGSERUM, HCG, HCGQUANT     ABGs: No results found for: PHART, PO2ART, HDY9VKS, YWV3UEM, BEART, B5JEZLLC     Type & Screen (If Applicable):  No results found for: LABABO, LABRH    Drug/Infectious Status (If Applicable):  No results found for: HIV, HEPCAB    COVID-19 Screening (If Applicable): No results found for: COVID19        Anesthesia Evaluation  Patient summary reviewed and Nursing notes reviewed  Airway: Mallampati: II  TM distance: >3 FB   Neck ROM: full  Mouth opening: > = 3 FB   Dental:    (+) edentulous      Pulmonary:Negative Pulmonary ROS and normal exam                               Cardiovascular:Negative CV ROS  Exercise tolerance: good (>4 METS),   (+) hypertension:, CAD:,                   Neuro/Psych:   Negative Neuro/Psych ROS              GI/Hepatic/Renal: Neg GI/Hepatic/Renal ROS            Endo/Other: Negative Endo/Other ROS   (+) hypothyroidism::., .                 Abdominal: normal exam            Vascular: negative vascular ROS. Other Findings:           Anesthesia Plan      general and TIVA     ASA 3       Induction: intravenous. Anesthetic plan and risks discussed with patient. Plan discussed with CRNA.                     KATINA Coombs - CRNA   7/19/2023

## 2023-07-19 NOTE — DISCHARGE INSTRUCTIONS
1. Await path results, the patient will be contacted in 7-10 days with biopsy results. If biopsies reveal villous atrophy, patient will need celiac disease antibody panel checked if not done previously for confirmation. 2. Repeat colonoscopy: pending pathology -in 1 year (with a strict 2-day clear liquid diet and a 2-day prep with Plenvu) with biopsies for follow-up of his lymphocytic colitis, after patient has withheld budesonide for at least 1 week prior to the procedure. - Resume previous meds and diet  - GI clinic f/u 8 to 10 weeks with Ms. Zoey Rasheed    - Keep scheduled f/u appts with other MDs     - NO ASA/NSAIDs x 2 weeks; also in light of his lymphocytic colitis, avoid NSAIDs indefinitely  NSAIDS Non-steroidal Anti-Inflammatory  You have been directed by your physician to avoid any NSAID's; the following medications are a list of those to avoid. If you think that you are taking any NSAID's notify your physician.    Over The Counter  Advil                      Motrin  Nuprin                   Ibuprofen  Midol                     Aleve  Naproxen              Orudis  Aspirin                   Moe  Prescriptions and Generics  Cataflam              Relafen  Voltaren               Clinoril  Indocin                 Naproxen  Arthrotec              Lodine  Daypro                 Nalfon  Toradol                Ansaid  Feldene               Meclofenamate  Fenoprofen          Ponstel  Mobic                   Celebrex  Vioxx

## 2023-07-19 NOTE — OP NOTE
Endoscopic Procedure Note    Patient: Maggie Chance: 1950  Med Rec#: [id number] Acc#: [id number]     Primary Care Provider KATINA Godinez    Endoscopist: Olive Moran MD, MD    Date of Procedure:  7/19/2023    Procedure:   1. EGD with cold biopsies    Indications:   1. Diarrhea-recent biopsies revealed Lymphocytic colitis  2. Abdominal cramping  3. Postprandial diarrhea  4. Unintentional weight loss  5. Recent colonoscopy attempt was suboptimal due to poor prep requiring this repeat exam after a more thorough prep    Anesthesia:  Sedation was administered by anesthesia who monitored the patient during the procedure. Estimated Blood Loss: minimal    Procedure:   After reviewing the patient's chart and obtaining informed consent, the patient was placed in the left lateral decubitus position. A forward-viewing Olympus endoscope was lubricated and inserted through the mouth into the oropharynx. Under direct visualization, the upper esophagus was intubated. The scope was advanced to the level of the third portion of duodenum. Scope was slowly withdrawn with careful inspection of the mucosal surfaces. The scope was retroflexed for inspection of the gastric fundus and incisura. Findings and maneuvers are listed in impression below. The patient tolerated the procedure well. The scope was removed. There were no immediate complications. Findings/IMPRESSION:  Esophagus: normal and a normal EG junction at 40 cm. There is no obvious hiatal hernia present. Stomach:  normal.    NO ulcers or masses or gastric outlet obstruction or retained food or fluid. Rugae were normal and lumen distended well with insufflation. Retroflexed views otherwise revealed a normal GE junction, fundus and cardia as well. Duodenum: Normal. Random biopsies were taken to check for Celiac disease and other causes of villous atrophy. RECOMMENDATIONS:    1.   Await path results, the patient will be

## 2023-07-19 NOTE — OP NOTE
Patient: Cletis Buerger : 1950  Med Rec#: [id number] Acc#: [id number]   Primary Care Provider KATINA Otero    Date of Procedure:  2023    Endoscopist: Alban Menon MD, MD    Referring Provider: KATINA Otero,     Operation Performed: Colonoscopy up to the terminal ileum with  Hot biopsy polypectomy of rectal polyp and  Random colon cold biopsies to check for active lymphocytic colitis    Indications:  1. Diarrhea-recent biopsies revealed Lymphocytic colitis  2. Abdominal cramping  3. Postprandial diarrhea  4. Unintentional weight loss  5. Recent colonoscopy attempt was suboptimal due to poor prep requiring this repeat exam after a more thorough prep    Anesthesia:  Sedation was administered by anesthesia who monitored the patient during the procedure. I met with Cletis Buerger prior to procedure. We discussed the procedure itself, and I have discussed the risks of endoscopy (including-- but not limited to-- pain, discomfort, bleeding potentially requiring second endoscopic procedure and/or blood transfusion, organ perforation requiring operative repair, damage to organs near the colon, infection, aspiration, cardiopulmonary/allergic reaction), benefits, indications to endoscopy. Additionally, we discussed options other than colonoscopy. The patient expressed understanding. All questions answered. The patient decided to proceed with the procedure. Signed informed consent was placed on the chart. Blood Loss: minimal    Withdrawal time: More than 10 minutes  Bowel Prep: Fair  with small amounts of thick solid and semisolid stool and a moderate amount of thick, opaque liquid scattered in patchy segments throughout the colon obscuring the underlying mucosa. Lesions including polyps may have been missed. Overall, the prep quality today was much better than at the time of patient's prior colonoscopy few weeks ago.     Complications: no immediate GI clinic f/u 8 to 10 weeks with Ms. Jeimy Ríos    - Keep scheduled f/u appts with other MDs     - NO ASA/NSAIDs x 2 weeks; also in light of his lymphocytic colitis, avoid NSAIDs indefinitely    Findings and recommendations were discussed w/ the patient. A copy of the images was provided.     (Please note that portions of this note were completed with a voice recognition program. Efforts were made to edit the dictations but occasionally words may be mis-transcribed.)     Nolvia Wellington MD, MD  7/19/2023  11:25 AM

## 2023-07-19 NOTE — ANESTHESIA POSTPROCEDURE EVALUATION
Department of Anesthesiology  Postprocedure Note    Patient: Sriram Gibson  MRN: [id number]  YOB: 1950  Date of evaluation: 7/19/2023      Procedure Summary     Date: 07/19/23 Room / Location: [facility] ENDO 02 / [address]    Anesthesia Start: 1114 Anesthesia Stop: 1197    Procedures:       EGD BIOPSY (Esophagus)      COLONOSCOPY DIAGNOSTIC (Abdomen) Diagnosis:       Diarrhea, unspecified type      Mucus in stool      Weight loss      (Diarrhea, unspecified type [R19.7])      (Mucus in stool [R19.5])      (Weight loss [R63.4])    Surgeons: Martha Mckee MD Responsible Provider: KATINA Clifford CRNA    Anesthesia Type: general, TIVA ASA Status: 3          Anesthesia Type: No value filed.     Elle Phase I:      Elle Phase II:        Anesthesia Post Evaluation    Patient location during evaluation: bedside  Patient participation: complete - patient participated  Level of consciousness: sleepy but conscious  Pain score: 0  Airway patency: patent  Nausea & Vomiting: no nausea and no vomiting  Complications: no  Cardiovascular status: blood pressure returned to baseline  Respiratory status: acceptable, room air and spontaneous ventilation  Hydration status: euvolemic

## 2023-09-13 ENCOUNTER — OFFICE VISIT (OUTPATIENT)
Dept: GASTROENTEROLOGY | Age: 73
End: 2023-09-13
Payer: MEDICARE

## 2023-09-13 VITALS
HEIGHT: 66 IN | WEIGHT: 188 LBS | BODY MASS INDEX: 30.22 KG/M2 | HEART RATE: 64 BPM | DIASTOLIC BLOOD PRESSURE: 80 MMHG | SYSTOLIC BLOOD PRESSURE: 120 MMHG | OXYGEN SATURATION: 98 %

## 2023-09-13 DIAGNOSIS — R10.9 ABDOMINAL CRAMPING: ICD-10-CM

## 2023-09-13 DIAGNOSIS — K52.832 LYMPHOCYTIC COLITIS: ICD-10-CM

## 2023-09-13 DIAGNOSIS — R19.7 DIARRHEA, UNSPECIFIED TYPE: Primary | ICD-10-CM

## 2023-09-13 PROCEDURE — G8427 DOCREV CUR MEDS BY ELIG CLIN: HCPCS | Performed by: NURSE PRACTITIONER

## 2023-09-13 PROCEDURE — 1036F TOBACCO NON-USER: CPT | Performed by: NURSE PRACTITIONER

## 2023-09-13 PROCEDURE — 3017F COLORECTAL CA SCREEN DOC REV: CPT | Performed by: NURSE PRACTITIONER

## 2023-09-13 PROCEDURE — 99214 OFFICE O/P EST MOD 30 MIN: CPT | Performed by: NURSE PRACTITIONER

## 2023-09-13 PROCEDURE — 1123F ACP DISCUSS/DSCN MKR DOCD: CPT | Performed by: NURSE PRACTITIONER

## 2023-09-13 PROCEDURE — G8417 CALC BMI ABV UP PARAM F/U: HCPCS | Performed by: NURSE PRACTITIONER

## 2023-09-13 NOTE — PROGRESS NOTES
ENDOSCOPY N/A 2023    Dr Elina Summers, (-)[place]       Family History   Problem Relation Age of Onset    Alzheimer's Disease Mother     Diabetes Mother     Hypertension Mother     Heart Disease Mother     Lung Cancer Father     Liver Cancer Father         started from Prostate    Prostate Cancer Father          age 76    Dementia Brother     Lung Cancer Maternal Uncle     Colon Cancer Paternal Aunt     Lung Cancer Paternal Uncle         never smoked    Leukemia Paternal Uncle     Cancer Paternal Uncle         Jaw cancer    Colon Polyps Neg Hx     Esophageal Cancer Neg Hx        Social History     Socioeconomic History    Marital status:    Tobacco Use    Smoking status: Never    Smokeless tobacco: Never   Vaping Use    Vaping Use: Never used   Substance and Sexual Activity    Alcohol use: Never    Drug use: Never       Current Outpatient Medications   Medication Sig Dispense Refill    budesonide (ENTOCORT EC) 3 MG extended release capsule Take 3 capsules by mouth every morning 90 capsule 3    aspirin 81 MG EC tablet Take 1 tablet by mouth daily      b complex vitamins capsule Take 1 capsule by mouth daily      vitamin B-12 (CYANOCOBALAMIN) 1000 MCG tablet Take 1 tablet by mouth daily      meclizine (ANTIVERT) 12.5 MG tablet Take 1 tablet by mouth 3 times daily as needed      acebutolol (SECTRAL) 200 MG capsule Take 1 capsule by mouth daily      ezetimibe (ZETIA) 10 MG tablet Take 1 tablet by mouth daily      FLUoxetine (PROZAC) 10 MG capsule Take 1 capsule by mouth daily      colesevelam (WELCHOL) 625 MG tablet Take 1 tablet by mouth daily      levothyroxine (SYNTHROID) 112 MCG tablet Take 1 tablet by mouth daily      cetirizine (ZYRTEC) 10 MG tablet Take 1 tablet by mouth daily      finasteride (PROSCAR) 5 MG tablet Take 1 tablet by mouth daily      coenzyme Q10 100 MG CAPS capsule Take 1 capsule by mouth daily      alfuzosin (UROXATRAL) 10 MG extended release tablet alfuzosin ER 10 mg

## 2023-09-15 ASSESSMENT — ENCOUNTER SYMPTOMS
CHOKING: 0
ANAL BLEEDING: 0
NAUSEA: 0
SHORTNESS OF BREATH: 0
COUGH: 0
BLOOD IN STOOL: 0
DIARRHEA: 0
RECTAL PAIN: 0
ABDOMINAL PAIN: 0
TROUBLE SWALLOWING: 0
ABDOMINAL DISTENTION: 0
CONSTIPATION: 0
VOMITING: 0

## 2023-09-29 ENCOUNTER — OFFICE VISIT (OUTPATIENT)
Dept: CARDIOLOGY | Facility: CLINIC | Age: 73
End: 2023-09-29
Payer: MEDICARE

## 2023-09-29 VITALS
DIASTOLIC BLOOD PRESSURE: 72 MMHG | WEIGHT: 186.2 LBS | OXYGEN SATURATION: 99 % | HEIGHT: 66 IN | BODY MASS INDEX: 29.92 KG/M2 | SYSTOLIC BLOOD PRESSURE: 128 MMHG | HEART RATE: 85 BPM

## 2023-09-29 DIAGNOSIS — Z78.9 STATIN INTOLERANCE: Chronic | ICD-10-CM

## 2023-09-29 DIAGNOSIS — I25.10 ATHEROSCLEROSIS OF NATIVE CORONARY ARTERY OF NATIVE HEART WITHOUT ANGINA PECTORIS: Primary | Chronic | ICD-10-CM

## 2023-09-29 DIAGNOSIS — I10 PRIMARY HYPERTENSION: Chronic | ICD-10-CM

## 2023-09-29 DIAGNOSIS — E66.09 NON MORBID OBESITY DUE TO EXCESS CALORIES: Chronic | ICD-10-CM

## 2023-09-29 DIAGNOSIS — E03.9 HYPOTHYROIDISM, UNSPECIFIED TYPE: ICD-10-CM

## 2023-09-29 DIAGNOSIS — E78.2 MIXED HYPERLIPIDEMIA: Chronic | ICD-10-CM

## 2023-09-29 DIAGNOSIS — Z95.5 S/P CORONARY ARTERY STENT PLACEMENT: Chronic | ICD-10-CM

## 2023-09-29 PROCEDURE — 99214 OFFICE O/P EST MOD 30 MIN: CPT | Performed by: NURSE PRACTITIONER

## 2023-09-29 PROCEDURE — 93000 ELECTROCARDIOGRAM COMPLETE: CPT | Performed by: NURSE PRACTITIONER

## 2023-09-29 PROCEDURE — 3074F SYST BP LT 130 MM HG: CPT | Performed by: NURSE PRACTITIONER

## 2023-09-29 PROCEDURE — 3078F DIAST BP <80 MM HG: CPT | Performed by: NURSE PRACTITIONER

## 2023-09-29 RX ORDER — NITROGLYCERIN 0.4 MG/1
0.4 TABLET SUBLINGUAL
Qty: 25 TABLET | Refills: 11 | Status: SHIPPED | OUTPATIENT
Start: 2023-09-29

## 2023-09-29 RX ORDER — BUDESONIDE 3 MG/1
9 CAPSULE, COATED PELLETS ORAL EVERY MORNING
COMMUNITY
Start: 2023-06-20

## 2023-09-29 NOTE — ASSESSMENT & PLAN NOTE
Remains stable without angina. Continue present therapy. Call if worsens. Encouraged lifestyle modifications. Continue EC aspirin 81 mg indefinitely - will clarify with GI.

## 2023-09-29 NOTE — Clinical Note
Check with GI to see if they wanted him to hold aspirin before colonoscopy and biopsies or longer. He really needs to stay on aspirin if at all possible given previous cardiac stenting. TX!

## 2023-09-29 NOTE — ASSESSMENT & PLAN NOTE
Patient's (Body mass index is 30.07 kg/m².) indicates that they are obese (BMI >30) with health conditions that include hypertension, coronary heart disease and dyslipidemias . Weight is improving with lifestyle modifications. BMI is is above average; BMI management plan is completed. We discussed portion control and increasing exercise.

## 2023-09-29 NOTE — PROGRESS NOTES
Encounter Date:09/29/2023  Chief Complaint:   Subjective    Apolinar Snow is a 72 y.o. male who presents to [facility] CARDIOLOG[facility] today for yearly cardiac follow-up. He continues to do very well.      History of Present Illness   HPI       Hypertension     Additional comments: Doesn't check very often at home. No headaches, dizziness, or nosebleeds.             Hyperlipidemia     Additional comments: Tolerating Leqvio but hasn't been scheduled for 6 month injection and hasn't repeated lipid panel and CMP as ordered to be done 5/2023.             Coronary Artery Disease     Additional comments: No chest discomfort. Tries to eat healthy. Staying active. Not exercising. Has retired. Reports some inflammation on scope (lymphocytic colitis per review of [facility] GI notes) - GI advised to stop aspirin. Started on budesonide.          Last edited by Massiel Mirza APRN on 9/29/2023  5:33 PM.        History: Past medical, surgical, family, and social history reviewed.    Outpatient Medications Marked as Taking for the 9/29/23 encounter (Office Visit) with Massiel Mirza APRN   Medication Sig Dispense Refill    acebutolol (SECTRAL) 200 MG capsule Take 1 capsule by mouth Daily.      alfuzosin (UROXATRAL) 10 MG 24 hr tablet alfuzosin ER 10 mg tablet,extended release 24 hr      aspirin 81 MG EC tablet Take 1 tablet by mouth Daily.      Budesonide (ENTOCORT EC) 3 MG 24 hr capsule Take 3 capsules by mouth Every Morning.      cetirizine (ZyrTEC) 10 MG tablet Take 1 tablet by mouth Daily.      Coenzyme Q10 (CO Q-10) 100 MG capsule Take 100 mg by mouth Daily.      colesevelam (WELCHOL) 625 MG tablet Take 1 tablet by mouth Daily.      ezetimibe (ZETIA) 10 MG tablet Take 1 tablet by mouth Daily.      finasteride (PROSCAR) 5 MG tablet Take 1 tablet by mouth Daily.      FLUoxetine (PROzac) 10 MG capsule Take 1 capsule by mouth Daily.      Inclisiran Sodium 284 MG/1.5ML solution prefilled syringe Inject 1.5  "mL under the skin into the appropriate area as directed Every 6 (Six) Months. Repeat x 1 in 3 months and then every 6 months  Indications: Disease involving Lipid Deposits in the Arteries, High Amount of Fats in the Blood 1.5 mL 2    levothyroxine (SYNTHROID, LEVOTHROID) 125 MCG tablet Take 150 mcg by mouth Daily.      meclizine (ANTIVERT) 12.5 MG tablet Take 1 tablet by mouth 3 (Three) Times a Day As Needed for Dizziness.      naproxen (NAPROSYN) 500 MG tablet naproxen 500 mg tablet   prn      nitroglycerin (NITROSTAT) 0.4 MG SL tablet Place 1 tablet under the tongue Every 5 (Five) Minutes As Needed for Chest Pain. Take no more than 3 doses in 15 minutes. 25 tablet 11    oxybutynin (DITROPAN) 5 MG tablet oxybutynin chloride 5 mg tablet      vitamin B-12 (CYANOCOBALAMIN) 500 MCG tablet Take 0.5 tablets by mouth Daily.      vitamin D (ERGOCALCIFEROL) 23549 UNITS capsule capsule Take 1 capsule by mouth 1 (One) Time Per Week.          Objective     Vital Signs:   /72 (BP Location: Left arm, Patient Position: Sitting, Cuff Size: Adult)   Pulse 85   Ht 167.6 cm (65.98\")   Wt 84.5 kg (186 lb 3.2 oz)   SpO2 99%   BMI 30.07 kg/m²   Wt Readings from Last 3 Encounters:   09/29/23 84.5 kg (186 lb 3.2 oz)   03/01/23 86.6 kg (191 lb)   12/01/22 87.5 kg (193 lb)         Vitals reviewed.   Constitutional:       Appearance: Well-developed.   Eyes:      General: No scleral icterus.  HENT:      Right Ear: Decreased hearing noted.      Left Ear: Decreased hearing noted.   Neck:      Vascular: No JVD.   Pulmonary:      Effort: Pulmonary effort is normal.      Breath sounds: Normal breath sounds.   Cardiovascular:      Normal rate. Regular rhythm.      No gallop.    Pulses:     Intact distal pulses.   Edema:     Peripheral edema absent.   Musculoskeletal:      Comments: Wearing knee compression bilaterally Skin:     General: Skin is warm and dry.   Neurological:      Mental Status: Alert and oriented to person, place, and " time.       Result Review  Data Reviewed:  The following data was reviewed by: JOSEPH Gómez on 09/29/2023  Colon biopsies and [facility] notes reviewed via Care Everywhere           ECG 12 Lead    Date/Time: 9/29/2023 4:26 PM  Performed by: Massiel Mirza APRN  Authorized by: Massiel Mirza APRN   Comparison: compared with previous ECG from 4/28/2023  Comparison to previous ECG: HR increased from 59 bpm and first degree heart block no longer present  Rhythm: sinus rhythm  Rate: normal  BPM: 78  Conduction: left bundle branch block    Clinical impression: abnormal EKG           Assessment and Plan   Problem List Items Addressed This Visit          Allergies and Adverse Reactions    Statin intolerance (Chronic)       Cardiac and Vasculature    Atherosclerosis of native coronary artery of native heart - Primary (Chronic)    Overview     40% RCA, patent L CX stent per 2019 cath         Current Assessment & Plan     Remains stable without angina. Continue present therapy. Call if worsens. Encouraged lifestyle modifications. Continue EC aspirin 81 mg indefinitely - will clarify with GI.         Relevant Medications    nitroglycerin (NITROSTAT) 0.4 MG SL tablet    Hyperlipidemia (Chronic)    Current Assessment & Plan     Wasn't controlled with Zetia and Welchol. Unknown control since starting Leqvio. Will get rescheduled for every six month injections. Advised him to get labs done at [facility] including CBC, CMP, and lipid panel. Hx statin intolerance. Again encouraged healthy diet and routine aerobic exercise.         Hypertension (Chronic)    Current Assessment & Plan     Remains very well controlled with BPH medications. Continue present therapy.          S/P coronary artery stent placement (Chronic)    Relevant Medications    nitroglycerin (NITROSTAT) 0.4 MG SL tablet       Endocrine and Metabolic    Non morbid obesity due to excess calories (Chronic)    Current Assessment & Plan     Patient's (Body  mass index is 30.07 kg/m².) indicates that they are obese (BMI >30) with health conditions that include hypertension, coronary heart disease and dyslipidemias . Weight is improving with lifestyle modifications. BMI is is above average; BMI management plan is completed. We discussed portion control and increasing exercise.          Hypothyroidism    Relevant Medications    Budesonide (ENTOCORT EC) 3 MG 24 hr capsule       Patient was given instructions and counseling regarding his condition or for health maintenance advice. Please see specific information pulled into the AVS if appropriate.    Follow Up :   Return in about 1 year (around 9/29/2024) for Recheck.           JOSEPH Gallardo, ACNP-BC, CHFN-BC

## 2023-09-29 NOTE — Clinical Note
Copy of most recent labs from Cici Watson-advise him to see her and get drawn if not done in past year. Last we have are from 9/2022. TX!

## 2023-09-29 NOTE — ASSESSMENT & PLAN NOTE
Wasn't controlled with Zetia and Welchol. Unknown control since starting Leqvio. Will get rescheduled for every six month injections. Advised him to get labs done at [facility] including CBC, CMP, and lipid panel. Hx statin intolerance. Again encouraged healthy diet and routine aerobic exercise.

## 2023-10-02 ENCOUNTER — TELEPHONE (OUTPATIENT)
Dept: CARDIOLOGY | Facility: CLINIC | Age: 73
End: 2023-10-02
Payer: MEDICARE

## 2023-10-02 ENCOUNTER — TELEPHONE (OUTPATIENT)
Dept: GASTROENTEROLOGY | Age: 73
End: 2023-10-02

## 2023-10-02 NOTE — TELEPHONE ENCOUNTER
Margy Soriano with [facility] cardio requests that a nurse return their call. Per marcela provider would like to know if pt needs to hold aspirin before upcoming procedure. Please call [phone] to advise. The best time to reach Margy Soriano  is  between 8am  and 4pm .     Thank you.

## 2023-10-02 NOTE — PROGRESS NOTES
Called [facility]. She stated they ask all pt's to hold asa 5 days prior to colonoscopy, but if you do a clearance and advise to continue we can fax a letter. Thanks

## 2023-10-02 NOTE — TELEPHONE ENCOUNTER
----- Message from JOSEPH Gómez sent at 9/29/2023  5:41 PM CDT -----  Check with GI to see if they wanted him to hold aspirin before colonoscopy and biopsies or longer. He really needs to stay on aspirin if at all possible given previous cardiac stenting. TX!

## 2023-10-02 NOTE — TELEPHONE ENCOUNTER
10- Gina with [facility] [phone]    Colonoscopy with Dr Mariposa Gonzalez, on 11-     Routed to [facility]

## 2023-10-02 NOTE — TELEPHONE ENCOUNTER
Called and left a message for a clinical staff member to call me back regarding holding ASA before colonoscopy procedure. Provider Massiel Mirza NP prefer he stay on ASA due to previous cardiac stents. See attached note.  Thanks

## 2023-10-12 ENCOUNTER — INFUSION (OUTPATIENT)
Dept: ONCOLOGY | Facility: HOSPITAL | Age: 73
End: 2023-10-12
Payer: MEDICARE

## 2023-10-12 VITALS
HEIGHT: 66 IN | SYSTOLIC BLOOD PRESSURE: 120 MMHG | HEART RATE: 74 BPM | RESPIRATION RATE: 18 BRPM | BODY MASS INDEX: 30.22 KG/M2 | DIASTOLIC BLOOD PRESSURE: 76 MMHG | OXYGEN SATURATION: 96 % | WEIGHT: 188 LBS | TEMPERATURE: 96.8 F

## 2023-10-12 DIAGNOSIS — E78.2 MIXED HYPERLIPIDEMIA: Primary | ICD-10-CM

## 2023-10-12 DIAGNOSIS — Z78.9 STATIN INTOLERANCE: ICD-10-CM

## 2023-10-12 PROCEDURE — 25010000002 INCLISIRAN SODIUM 284 MG/1.5ML SOLUTION PREFILLED SYRINGE: Performed by: NURSE PRACTITIONER

## 2023-10-12 PROCEDURE — 96372 THER/PROPH/DIAG INJ SC/IM: CPT

## 2023-10-12 RX ADMIN — INCLISIRAN 284 MG: 284 INJECTION, SOLUTION SUBCUTANEOUS at 14:36

## 2024-01-01 NOTE — PROGRESS NOTES
Patient notified of good lipid control and normal CBC and chemistry done for preop heart cath today - no new findings on heart cath. Continue medical mgt.
-Breastfeed every 2-3 hours and on demand (at least 15-20 minutes on each breast with swallowing observed). Follow Breastfeeding Log.

## 2024-04-10 DIAGNOSIS — Z95.5 PRESENCE OF STENT IN CORONARY ARTERY: ICD-10-CM

## 2024-04-10 DIAGNOSIS — E78.2 MIXED HYPERLIPIDEMIA: Chronic | ICD-10-CM

## 2024-04-10 DIAGNOSIS — I25.10 ATHEROSCLEROSIS OF NATIVE CORONARY ARTERY OF NATIVE HEART WITHOUT ANGINA PECTORIS: Chronic | ICD-10-CM

## 2024-04-23 ENCOUNTER — INFUSION (OUTPATIENT)
Dept: ONCOLOGY | Facility: HOSPITAL | Age: 74
End: 2024-04-23
Payer: MEDICARE

## 2024-04-23 VITALS
OXYGEN SATURATION: 97 % | BODY MASS INDEX: 30.44 KG/M2 | RESPIRATION RATE: 18 BRPM | TEMPERATURE: 96.7 F | SYSTOLIC BLOOD PRESSURE: 123 MMHG | HEART RATE: 72 BPM | WEIGHT: 188.6 LBS | DIASTOLIC BLOOD PRESSURE: 73 MMHG

## 2024-04-23 DIAGNOSIS — E78.2 MIXED HYPERLIPIDEMIA: Primary | ICD-10-CM

## 2024-04-23 DIAGNOSIS — Z78.9 STATIN INTOLERANCE: ICD-10-CM

## 2024-04-23 PROCEDURE — 25010000002 INCLISIRAN SODIUM 284 MG/1.5ML SOLUTION PREFILLED SYRINGE: Performed by: NURSE PRACTITIONER

## 2024-04-23 PROCEDURE — 96372 THER/PROPH/DIAG INJ SC/IM: CPT

## 2024-04-23 RX ADMIN — INCLISIRAN 284 MG: 284 INJECTION, SOLUTION SUBCUTANEOUS at 13:16

## 2024-07-09 ENCOUNTER — PROCEDURE VISIT (OUTPATIENT)
Dept: OTOLARYNGOLOGY | Facility: CLINIC | Age: 74
End: 2024-07-09
Payer: MEDICARE

## 2024-07-09 ENCOUNTER — OFFICE VISIT (OUTPATIENT)
Dept: OTOLARYNGOLOGY | Facility: CLINIC | Age: 74
End: 2024-07-09
Payer: MEDICARE

## 2024-07-09 VITALS
DIASTOLIC BLOOD PRESSURE: 87 MMHG | TEMPERATURE: 98.1 F | HEIGHT: 66 IN | WEIGHT: 196.4 LBS | BODY MASS INDEX: 31.57 KG/M2 | HEART RATE: 67 BPM | SYSTOLIC BLOOD PRESSURE: 135 MMHG

## 2024-07-09 DIAGNOSIS — R42 DIZZINESS: ICD-10-CM

## 2024-07-09 DIAGNOSIS — H91.8X3 ASYMMETRICAL HEARING LOSS: ICD-10-CM

## 2024-07-09 DIAGNOSIS — H90.3 SENSORINEURAL HEARING LOSS, BILATERAL: Primary | ICD-10-CM

## 2024-07-09 DIAGNOSIS — H93.13 TINNITUS OF BOTH EARS: Primary | ICD-10-CM

## 2024-07-09 DIAGNOSIS — H93.13 TINNITUS, BILATERAL: ICD-10-CM

## 2024-07-09 DIAGNOSIS — H90.3 SENSORINEURAL HEARING LOSS (SNHL) OF BOTH EARS: ICD-10-CM

## 2024-07-09 PROCEDURE — 1160F RVW MEDS BY RX/DR IN RCRD: CPT | Performed by: NURSE PRACTITIONER

## 2024-07-09 PROCEDURE — 1159F MED LIST DOCD IN RCRD: CPT | Performed by: NURSE PRACTITIONER

## 2024-07-09 PROCEDURE — 99213 OFFICE O/P EST LOW 20 MIN: CPT | Performed by: NURSE PRACTITIONER

## 2024-07-09 PROCEDURE — 3079F DIAST BP 80-89 MM HG: CPT | Performed by: NURSE PRACTITIONER

## 2024-07-09 PROCEDURE — 3075F SYST BP GE 130 - 139MM HG: CPT | Performed by: NURSE PRACTITIONER

## 2024-07-09 PROCEDURE — 92567 TYMPANOMETRY: CPT

## 2024-07-09 PROCEDURE — 92557 COMPREHENSIVE HEARING TEST: CPT

## 2024-07-09 NOTE — PROGRESS NOTES
JOSEPH Sharpe  W ENT [facility]  [address], [address]  [place] [id number]  Fax [phone]  Phone [phone]      Visit Type: NEW PATIENT   Chief Complaint   Patient presents with    Tinnitus     bilateral           HPI  Apolinar Snow is a 73 y.o. male who presents for evaluation of ear complaints.  Complaints have been present for months. Localized to ears bilaterally, right worse than left. Associated symptoms include some chronic hearing loss and dizziness.   Tinnitus is described as constant buzzing.   He describes the dizziness as some spinning sensation with rapid head movement standing, or bending over and standing up. He gets nauseated with this, it has been present for years. Rest and meclizine helps this.   He denies known improving or worsening factors for tinnitus.       Past Medical History:   Diagnosis Date    Atherosclerosis of native coronary artery of native heart     BPH (benign prostatic hyperplasia)     Chest pain     COVID-19 12/2022    Treated with Paxlovid    Disease of thyroid gland     Hyperlipidemia     Hypertension     LBBB (left bundle branch block)     Lymphocytic colitis 2023       Past Surgical History:   Procedure Laterality Date    APPENDECTOMY      CARDIAC CATHETERIZATION      STENT    CARDIAC CATHETERIZATION Left 1/11/2019    Procedure: Cardiac catheterization LCP with possible PCI;  Surgeon: Hipolito Quick MD;  Location: [facility] CATH INVASIVE LOC[facility];  Service: Cardiology    CHOLECYSTECTOMY      [facility]ON[facility]  [facility] 2.5x15 mm MAGDA    CYST REMOVAL      KNEE ARTHROSCOPY         Family History: His family history includes Alzheimer's disease in his brother and mother; Diabetes in his brother and mother; Heart failure in his mother; Prostate cancer in his father.     Social History: He  reports that he has never smoked. He has never used smokeless  tobacco. He reports that he does not currently use alcohol. He reports that he does not use drugs.    Home Medications:  Budesonide, Co Q-10, FLUoxetine, Inclisiran Sodium, acebutolol, alfuzosin, aspirin, cetirizine, colesevelam, ezetimibe, finasteride, levothyroxine, meclizine, naproxen, nitroglycerin, ondansetron ODT, oxybutynin, vitamin B-12, and vitamin D    Allergies:  He has No Known Allergies.       Vital Signs:   Temp:  [98.1 °F (36.7 °C)] 98.1 °F (36.7 °C)  Heart Rate:  [67] 67  BP: (135)/(87) 135/87  ENT Physical Exam  Constitutional  Appearance: patient appears well-developed, well-nourished and well-groomed,  Communication/Voice: communication appropriate for developmental age; vocal quality normal;  Head and Face  Appearance: head appears normal, face appears normal and face appears atraumatic;  Palpation: facial palpation normal;  Ear  Hearing: intact;  Auricles: bilateral auricles normal;  External Mastoids: bilateral external mastoids normal;  Ear Canals: bilateral ear canals normal;  Tympanic Membranes: bilateral tympanic membranes normal;  Nose  External Nose: nares patent bilaterally; external nose normal;  Oral Cavity/Oropharynx  Lips: normal;  Teeth: dentures noted;  Gums: gingiva normal;  Tongue: normal;  Oral mucosa: normal;  Hard palate: normal;  Soft palate: normal;  Tonsils: normal;  Base of Tongue: normal;  Posterior pharyngeal wall: normal;  Neck  Neck: neck normal; neck palpation normal;  Respiratory  Inspection: breathing unlabored;  Cardiovascular  Inspection: extremities are warm and well perfused;  Neurovestibular  Mental Status: alert and oriented;           Result Review       RESULTS REVIEW    I have reviewed the patients old records in the chart.   The following results/records were reviewed:     Procedure visit with Melba Johnson AUD (07/09/2024)          Assessment & Plan  Tinnitus of both ears    Asymmetrical hearing loss    Sensorineural hearing loss (SNHL) of both  ears    Dizziness       Orders Placed This Encounter   Procedures    MRI internal auditory canal w wo    MRI Brain With & Without Contrast         Audio reviewed in office with patient.  Shows type a tympanometry bilaterally.  Audiogram showed bilateral sensorineural hearing loss with asymmetric loss right worse than left and a word rec score discrepancy of 80 on the right versus 100 on the left.Otoscopic examination reveals relatively normal tympanic membranes no obvious findings to explain hearing loss or other concerns at this time.  Treatment options discussed.  Due to concerns of the asymmetry on the hearing test we discussed an MRI and he is agreeable to pursue this, we we will obtain an MRI of the IACs and I will call him with the results.  Based on his audiogram he is a candidate for hearing aids he states he will consider.      His dizziness does sound like it could be vertigo as he describes a somewhat spinning sensation but due to most notable triggering events of rapid position changes I am unsure of this versus possible orthostatic component we will start some basic interventions and follow-up if still ongoing we will consider further vestibular testing.    For the ongoing tinnitus and dizziness we will pursue some conservative measures to begin with.  Will follow-up in about 2 months for recheck to discuss this further we will progress as needed.      Call for ear problems, especially change of hearing, ear pain or dizziness.  For the best results, use nasal sprays every day. It may take a week to build up in the nasal lining and a few more weeks to improve the eustachian tube function.  Protect getting water in the ears. If needed, may use over the counter silicone plugs or a cotton ball coated with vasoline when bathing.  Use hairdryer on a cool setting after bathing.  Use hearing protection in loud noise situations.  Consider hearing aid amplification.  Use home masking techniques- use low sound  level of a pleasant sound like a fan or radio at night to drown out the tinnitus sound. If not working, can consider formal masking device through our hearing aid department.  Start Flonase  Pop ears  Home tinnitus recommendations  Home vestibular exercises  Low-salt diet  Increase water intake, decrease caffeine and other stimulants      Call with questions or concerns     Return in about 2 months (around 9/9/2024) for Recheck Tinnitus, review MRI.        Electronically signed by JOSEPH Sharpe 07/09/24 11:29 AM CDT.

## 2024-07-09 NOTE — PATIENT INSTRUCTIONS
>NASAL STEROID use:  Using nasal steroids:  You will be prescribed one of the following nasal steroids: Flonase, Nasacort, Nasonex, Rhinocort, Qnasl, Zetonna  2 puffs each nostril 2 times daily  Start as soon as possible  If you are using Afrin for 3 days with the nasal steroid,  Use Afrin first and wait 10 minutes to allow the nose to open. Then administer nasal steroids.   >NASAL SALINE:  Use 2 puffs each nostril 4-6 times daily and more frequently if possible.  You can buy saline spray or you can make your own and use an old spray bottle to administer  Use a humidifier at bedside  Recipe for saline:  Water                                 1 quart  Salt (table)                        1 tablespoon  Gylcerin (or Liyah Syrup)    1 teaspoon  Sodium bicarbonate           1 teaspoon  Sprays or Nancy pots are recommended    Do not allow to stand for more than 24 hrs. Make new solution. There is no preservative in this solution.    Sinus irrigation and saline application can be enhanced with various over-the-counter products.  A WaterPik can be quite useful to irrigate, especially following sinus surgery.  Navage makes a product that irrigates the nose and some of the sinuses.  NeilMed makes a Sinu-Gator to irrigate the sinuses.  Neomed also has canned saline that we will come out under pressure.  A Nancy pot can also be helpful.  All of these products help keep the nose clear of debris.  Please use as directed on the instructions that come with the particular device.       TINNITUS  Tinnitus (latin for ringing) is the sensation of noise in the ears. This symptom can be quite variable and disconcerting. Most people have had ringing in the ears but most do not require treatment. Only 6% of people have ringing troubling enough to seek treatment.    Symptoms can range from ringing to “noise” of any sort in the ear or ears. Timing can vary as well. There are no specific symptom requirements to have tinnitus. It is speculated  that the inner ear hair cells (responsible for hearing) may be dying and causing the brain to seek additional input for sound that is missing. There are many theories for the etiology of tinnitus or ringing.    You may be referred for hearing testing or imaging of the ears/brain to try to determine what is causing ringing and how to best treat the condition.    Tinnitus Recommendations-  >Avoid loud or sudden noise  >Wear hearing protection in the presence of loud noise  >Avoid irritants like caffeine, nicotine, tonic water, malaria medications, alcohol, aspirin- please tell MD if you are taking any of these medications  >In a quiet environment or while sleeping, use a white noise generator or radio station to provide background noise  >Relaxation and stress management techniques are useful  >Biofeedback  >Complementary medicine may be of benefit  >Wear a hearing aid or tinnitus masker/retrainer  >Psychological counseling may be beneficial in some situations  >Exercise!!  >Restorative Sleep!!    Medical therapy for ringing (may include)-  Do nothing  Medications for ringing  IV medication  Ear perfusion- inner ear surgery to reduce ringing  Hearing Aids, Tinnitus maskers, Tinnitus retrainers  Biofeedback  Psychological counseling    All options will be reviewed at your visit. Treating tinnitus can be difficult and frustrating. There really is no cure but rather control. This can be time consuming to treat. The patient determines how much treatment is warranted if there are no associated medical conditions requiring therapy. Please be patient.       VESTIBULAR EXERCISES:    Goals:  >To develop proprioceptive and visual mechanisms to compensate for a disturbance in balance function (labyrinthine system)  >To improve muscle coordination in general  T>o practice balancing under everyday conditions with special attention to using the eyes,  muscle and joint senses  >To train the movement of the eyes independent of the  "head  >To loosen the muscles of the neck and shoulder to overcome the protective muscular spasm and tendency to move \"in one piece\"  >To practice head movements that cause dizziness and thus gradually overcome the disability  >To encourage the restoration of self-confidence and easy spontaneous motion     Exercise Program:  A. Eye exercises (while seated in bed). Perform 5 to 10 minutes at least 5 times each day; first do slowly, then quickly. Perform 20 times each.  1.     Up and down  2.     Side to side  3.     Diagonal  4.     Focus on finger moving from 3 feet to one foot from face    B. Head exercises. To be done at first slowly with eyes open in primary position, then quickly. Later do with eyes closed. Perform 20 times each.  1.     Bending forward and backward  2.     Turning from side to side  3.     Tilting from side to side  4.     Diagonal movements    C. Coordinate the movement of head and eyes in the same direction as in B.    D. Shoulder shrugging and circling. Perform 20 times each.    E. While seated, bend forward and  objects from the ground. Perform 20 times.    F.  Standing exercises. Perform 20 times each.  1. Repeat section A  2. Change from a sitting to a standing position with the eyes open and shut.  3. Throw ball from hand to hand, above eye level             4. Throw ball from hand to hand, under knee  5. Change form from sitting to standing, turning around in between     Full activity: Perform 10 times each.  1. Walk across room with eyes open, then closed  2. Walk up and down a slope with eyes open, then closed  3. Walk up and down steps with eyes open, then closed  4. Sit up and lie down in bed  5. Stand up and sit down in a chair  6. Recover balance when pushed in any direction  7. Throw and catch a ball  8. Any game involving stooping, straining and aiming     The following are of value in rehabilitating patients with balance problems:  1. A light cane may be used, not for " walking, but to provide additional proprioceptive            orienting input.  2. While walking, the patient should not look down, but rather select a distant point for          fixation.  3. Night-lights should be left on in the patient’s home.  4. In-patients with cervical spine problems, especially in those that head turning        increase unsteadiness, the use of soft foam cervical collar limits motion and improves         stability. Exercises should be modified accordingly.  5. Mild central stimulants (Ritalin, Caffeine) are useful in alerting the patient to respond       quickly to orienting input.  6. Optimal visual correction should be achieved and maintained. If cataract surgery is           being performed, then contact lens are recommended to avoid optical distortion.  7.  Extreme fatigue and stress is to be avoided, as this may worsen the dizziness.  8.  Sedatives, vestibular suppressants and tranquilizers (Valium, Phenergan, Antivert, Dramamine, Klonopin, etc.) are to be avoided, as this may slow compensation by the brain and cause drowsiness.

## 2024-07-09 NOTE — PROGRESS NOTES
AUDIOMETRIC EVALUATION      Name:  Apolinar Snow  :  1950  Age:  73 y.o.  Date of Evaluation:  2024       History:  Mr. Snow is seen today for a hearing evaluation due to tinnitus at the request of JOSEPH Lewis.    Audiologic Information:  Concerns for Hearing: No  PETs: No  Other otologic surgical history: No  Aural Pressure/Fullness: No  Otalgia: No  Otorrhea: No  Tinnitus: Constant bilateral ringing  Dizziness: some off-balance (has had a few falls)  Noise Exposure: hunts without hearing protection  Family history of hearing loss: several brothers with hearing aids  Head trauma requiring hospital stay: No  Chemotherapy: No  Other significant history: hypertension (controlled)    **Case history obtained in office and through EMR system      EVALUATION:        RESULTS:    Otoscopic Evaluation:  Right: clear canal, tympanic membrane visualized  Left: clear canal, tympanic membrane visualized    Tympanometry (226 Hz):  Right: Type A  Left: Type A    Pure Tone Audiometry:    Right: Mild (250Hz-1500Hz) sloping to profound (8000Hz) sensorineural hearing loss   Left: Normal (250Hz-1000Hz) sloping to profound (6000Hz) sensorineural hearing loss     Speech Audiometry:   Right: Speech Reception Threshold (SRT) was obtained at 30 dB HL  Word Recognition scores - Good (80)% at 70 dB with 50 dB of contralateral masking, using NU-6 List 1A with 25 words  Left: Speech Reception Threshold (SRT) was obtained at 25 dB HL  Word Recognition scores - Excellent (100)% at 65 dB with 45 dB of contralateral masking, using NU-6 List 1A with 10 words  SRT/PTA in good agreement bilaterally.        IMPRESSIONS:  Tympanometry showed normal middle ear pressure and static compliance, consistent with normal middle ear function for both ears. Pure tone thresholds for both ears show sensorineural hearing loss, suggesting normal outer/middle ear function and abnormal cochlear/retrocochlear function. Patient was counseled with  regard to the findings.  Amplification needs:  Patient could benefit from hearing aids. Patient might have relief from their tinnitus with hearing aid use.    Diagnosis:  1. Sensorineural hearing loss, bilateral    2. Tinnitus, bilateral         RECOMMENDATIONS/PLAN:  Follow-up recommendations per JOSEPH Lewis.  Practice good communication strategies to assist with everyday listening (eye contact with speakers, reduce background noise, encourage others to communicate clearly and slowly).  Tinnitus management strategies. Consider use of amplification, a white noise machine, low level TV/radio, or fan at night to help mask the tinnitus. It is also recommended to avoid caffeine, alcohol, tobacco, salt, high dose NSAIDs, and to increase hydration. Additional resources: Resound Relief jnen and American Tinnitus Association (www.harlan.org).  Consistent utilization of hearing protection devices in noisy environments.  Hearing aid evaluation and counseling upon medical clearance and patient motivation.   Repeat hearing evaluation if changes in hearing are noted or concerns arise.  Discussed results and recommendations with patient. Questions were addressed and the patient was encouraged to contact our department should concerns arise.        Melba Garnica, CCC-A, F-AAA  Doctor of Audiology

## 2024-08-07 ENCOUNTER — HOSPITAL ENCOUNTER (OUTPATIENT)
Dept: MRI IMAGING | Facility: HOSPITAL | Age: 74
Discharge: HOME | End: 2024-08-07
Payer: MEDICARE

## 2024-08-07 DIAGNOSIS — H91.8X3 ASYMMETRICAL HEARING LOSS: ICD-10-CM

## 2024-08-07 DIAGNOSIS — H90.3 SENSORINEURAL HEARING LOSS (SNHL) OF BOTH EARS: ICD-10-CM

## 2024-08-07 DIAGNOSIS — H93.13 TINNITUS OF BOTH EARS: ICD-10-CM

## 2024-08-07 LAB — CREAT BLDA-MCNC: 1 MG/DL (ref 0.6–1.3)

## 2024-08-07 PROCEDURE — 82565 ASSAY OF CREATININE: CPT

## 2024-08-07 PROCEDURE — 70553 MRI BRAIN STEM W/O & W/DYE: CPT

## 2024-08-07 PROCEDURE — A9577 INJ MULTIHANCE: HCPCS | Performed by: NURSE PRACTITIONER

## 2024-08-07 PROCEDURE — 0 GADOBENATE DIMEGLUMINE 529 MG/ML SOLUTION: Performed by: NURSE PRACTITIONER

## 2024-08-07 RX ADMIN — GADOBENATE DIMEGLUMINE 20 ML: 529 INJECTION, SOLUTION INTRAVENOUS at 15:48

## 2024-08-08 ENCOUNTER — TELEPHONE (OUTPATIENT)
Dept: OTOLARYNGOLOGY | Facility: CLINIC | Age: 74
End: 2024-08-08
Payer: MEDICARE

## 2024-08-08 NOTE — TELEPHONE ENCOUNTER
"Gave patient results of MRI per Kwasi Payne \"No retrocochlear pathology to explain asymmetric hearing loss.  Mild chronic small vessel ischemic changes would just advise follow-up PCP discussed this.  Follow-up as scheduled for recheck. \" He has appt salomon with Kwasi on 9-9-24-24. He VU    Faxed over MRI report to PCP, requested that the look at results  "

## 2024-08-08 NOTE — TELEPHONE ENCOUNTER
----- Message from Kwasi Payne sent at 8/8/2024  8:39 AM CDT -----  No retrocochlear pathology to explain asymmetric hearing loss.  Mild chronic small vessel ischemic changes would just advise follow-up PCP discussed this.  Follow-up as scheduled for recheck.

## 2024-09-09 ENCOUNTER — OFFICE VISIT (OUTPATIENT)
Dept: OTOLARYNGOLOGY | Facility: CLINIC | Age: 74
End: 2024-09-09
Payer: MEDICARE

## 2024-09-09 VITALS
WEIGHT: 193 LBS | DIASTOLIC BLOOD PRESSURE: 79 MMHG | SYSTOLIC BLOOD PRESSURE: 120 MMHG | HEIGHT: 66 IN | BODY MASS INDEX: 31.02 KG/M2 | TEMPERATURE: 98.2 F | HEART RATE: 66 BPM

## 2024-09-09 DIAGNOSIS — H93.13 TINNITUS, BILATERAL: ICD-10-CM

## 2024-09-09 DIAGNOSIS — H91.8X3 ASYMMETRICAL HEARING LOSS: ICD-10-CM

## 2024-09-09 DIAGNOSIS — H90.3 SENSORINEURAL HEARING LOSS, BILATERAL: Primary | ICD-10-CM

## 2024-09-09 DIAGNOSIS — R42 DIZZINESS: ICD-10-CM

## 2024-09-09 PROCEDURE — 3074F SYST BP LT 130 MM HG: CPT | Performed by: NURSE PRACTITIONER

## 2024-09-09 PROCEDURE — 3078F DIAST BP <80 MM HG: CPT | Performed by: NURSE PRACTITIONER

## 2024-09-09 PROCEDURE — 99213 OFFICE O/P EST LOW 20 MIN: CPT | Performed by: NURSE PRACTITIONER

## 2024-09-09 PROCEDURE — 1160F RVW MEDS BY RX/DR IN RCRD: CPT | Performed by: NURSE PRACTITIONER

## 2024-09-09 PROCEDURE — 1159F MED LIST DOCD IN RCRD: CPT | Performed by: NURSE PRACTITIONER

## 2024-09-09 NOTE — PROGRESS NOTES
JOSEPH Sharpe  [facility] EAR NOSE & THROAT  [address], [address]  [facility] [address]  Fax [phone]  Phone [phone]      Visit Type: FOLLOW UP   Chief Complaint   Patient presents with    Tinnitus    Discuss MRI           Tinnitus      Apolinar Snow is a 73 y.o. male who presents for follow-up, recheck of ear complaints.  Complaints have been present for months. Localized to ears bilaterally, right worse than left. Associated symptoms include some chronic hearing loss and dizziness.   Tinnitus is described as constant buzzing.   He describes the dizziness as some spinning sensation with rapid head movement standing, or bending over and standing up. He gets nauseated with this, it has been present for years. Rest and meclizine helps this.   He denies known improving or worsening factors for tinnitus.     Since last visit he reports reports no change in symptoms. He does feel the masking helps tinnitus some but otherwise no change in symptoms.     Past Medical History:   Diagnosis Date    Atherosclerosis of native coronary artery of native heart     BPH (benign prostatic hyperplasia)     Chest pain     COVID-19 12/2022    Treated with Paxlovid    Disease of thyroid gland     Hyperlipidemia     Hypertension     LBBB (left bundle branch block)     Lymphocytic colitis 2023       Past Surgical History:   Procedure Laterality Date    APPENDECTOMY      CARDIAC CATHETERIZATION      STENT    CARDIAC CATHETERIZATION Left 1/11/2019    Procedure: Cardiac catheterization LCP with possible PCI;  Surgeon: Hipolito Quick MD;  Location: [facility] CATH INVASIVE LOCATION;  Service: Cardiology    CHOLECYSTECTOMY      CORONARY STENT PLACEMENT  02/20/2015    [facility], Dr. Quick, NELI CX Xience 2.5x15 mm MAGDA    CYST REMOVAL      KNEE ARTHROSCOPY         Family History: His family history includes Alzheimer's disease in his brother and mother; Diabetes in his brother and  mother; Heart failure in his mother; Prostate cancer in his father.     Social History: He  reports that he has never smoked. He has never used smokeless tobacco. He reports that he does not currently use alcohol. He reports that he does not use drugs.    Home Medications:  Budesonide, Co Q-10, FLUoxetine, Inclisiran Sodium, acebutolol, alfuzosin, aspirin, cetirizine, colesevelam, ezetimibe, finasteride, levothyroxine, meclizine, naproxen, nitroglycerin, ondansetron ODT, oxybutynin, vitamin B-12, and vitamin D    Allergies:  He has No Known Allergies.       Vital Signs:   Temp:  [98.2 °F (36.8 °C)] 98.2 °F (36.8 °C)  Heart Rate:  [66] 66  BP: (120)/(79) 120/79  ENT Physical Exam  Constitutional  Appearance: patient appears well-developed, well-nourished and well-groomed,  Communication/Voice: communication appropriate for developmental age; vocal quality normal;  Head and Face  Appearance: head appears normal, face appears normal and face appears atraumatic;  Palpation: facial palpation normal;  Ear  Hearing: intact;  Auricles: bilateral auricles normal;  External Mastoids: bilateral external mastoids normal;  Ear Canals: bilateral ear canals normal;  Tympanic Membranes: bilateral tympanic membranes normal;  Nose  External Nose: nares patent bilaterally; external nose normal;  Oral Cavity/Oropharynx  Lips: normal;  Teeth: dentures noted;  Gums: gingiva normal;  Tongue: normal;  Oral mucosa: normal;  Hard palate: normal;  Soft palate: normal;  Tonsils: normal;  Base of Tongue: normal;  Posterior pharyngeal wall: normal;  Neck  Neck: neck normal; neck palpation normal;  Respiratory  Inspection: breathing unlabored;  Cardiovascular  Inspection: extremities are warm and well perfused;  Neurovestibular  Mental Status: alert and oriented;           Result Review       RESULTS REVIEW    I have reviewed the patients old records in the chart.   The following results/records were reviewed:     Procedure visit with Alex  YULISSA Reilly (07/09/2024)     MRI Internal Auditory Canal With Wo (08/07/2024 15:48)     MRI Brain With & Without Contrast (08/07/2024 15:48)          Assessment & Plan  Sensorineural hearing loss, bilateral    Tinnitus, bilateral    Asymmetrical hearing loss    Dizziness                MRI reviewed in office with patient, shows no retrocochlear pathology to explain hearing loss.  Normal fluid signal in the inner ears, incidentally noted mild chronic small vessel ischemic changes the patient will follow-up PCP to discuss or monitor as needed.     Reviewed audio from last visit again with patient, at this point he is interested in hearing aids for his tinnitus and hearing loss.  He does describe ongoing spinning dizziness, again this could be inner ear versus orthostatic.  Will plan to pursue Rachel-Hallpike at follow-up and will continue with basic precautions for this.  He has had this for years and symptoms seem somewhat mild, he is on meclizine prescribed by other provider for this I have advised he may continue as needed.    Medical and surgical options were discussed including observation, continued medical management, medication modification, surgical management, and hearing aid trial. Risks, benefits and alternatives were discussed and questions were answered. After considering the options, the patient decided to proceed with hearing aid trial.  Call for ear problems, especially change of hearing, ear pain or dizziness.  For the best results, use nasal sprays every day. It may take a week to build up in the nasal lining and a few more weeks to improve the eustachian tube function.  Protect getting water in the ears. If needed, may use over the counter silicone plugs or a cotton ball coated with vasoline when bathing.  Use hairdryer on a cool setting after bathing.  We will follow perforation conservatively. If not closing or if symptoms develop, will consider operative closure in the future.  We will follow  retraction for now. Call for drainage, pain or worsening hearing. If worsening or not improving, may need to consider more aggressive approach.  Use hearing protection in loud noise situations.  Consider hearing aid amplification.  Use home masking techniques- use low sound level of a pleasant sound like a fan or radio at night to drown out the tinnitus sound. If not working, can consider formal masking device through our hearing aid department.  Follow a low salt diet to try to prevent inner ear fluid accumulation.  Decrease caffeine, avoid red wine, nitrites in cured meats, food additives (like monosodium glutamate/ MSG) and dyes.  He is interested in hearing aids, will refer to outside audiology group for this.  Continue home tinnitus recommendations  Continue vestibular exercises and interventions  Will pursue Charissa and follow-up further workup  Flonase  Saline spray    Call questions or concerns    Return in about 3 months (around 12/9/2024) for Recheck with cristiana mckeon.        Electronically signed by JOSEPH Sharpe 09/09/24 10:56 AM CDT.

## 2024-09-09 NOTE — PATIENT INSTRUCTIONS
>NASAL STEROID use:  Using nasal steroids:  You will be prescribed one of the following nasal steroids: Flonase, Nasacort, Nasonex, Rhinocort, Qnasl, Zetonna  2 puffs each nostril 2 times daily  Start as soon as possible  If you are using Afrin for 3 days with the nasal steroid,  Use Afrin first and wait 10 minutes to allow the nose to open. Then administer nasal steroids.   >NASAL SALINE:  Use 2 puffs each nostril 4-6 times daily and more frequently if possible.  You can buy saline spray or you can make your own and use an old spray bottle to administer  Use a humidifier at bedside  Recipe for saline:  Water                                 1 quart  Salt (table)                        1 tablespoon  Gylcerin (or Liyah Syrup)    1 teaspoon  Sodium bicarbonate           1 teaspoon  Sprays or Nancy pots are recommended    Do not allow to stand for more than 24 hrs. Make new solution. There is no preservative in this solution.    Sinus irrigation and saline application can be enhanced with various over-the-counter products.  A WaterPik can be quite useful to irrigate, especially following sinus surgery.  Navage makes a product that irrigates the nose and some of the sinuses.  NeilMed makes a Sinu-Gator to irrigate the sinuses.  Neomed also has canned saline that we will come out under pressure.  A Nancy pot can also be helpful.  All of these products help keep the nose clear of debris.  Please use as directed on the instructions that come with the particular device.   How to pop ears:  Pop your ears by holding nose and closing mouth, then blow hard until ears pop  Children (less than 8-9 years)- blow up ballons 4 times a day and more frequently   TINNITUS  Tinnitus (latin for ringing) is the sensation of noise in the ears. This symptom can be quite variable and disconcerting. Most people have had ringing in the ears but most do not require treatment. Only 6% of people have ringing troubling enough to seek  treatment.    Symptoms can range from ringing to “noise” of any sort in the ear or ears. Timing can vary as well. There are no specific symptom requirements to have tinnitus. It is speculated that the inner ear hair cells (responsible for hearing) may be dying and causing the brain to seek additional input for sound that is missing. There are many theories for the etiology of tinnitus or ringing.    You may be referred for hearing testing or imaging of the ears/brain to try to determine what is causing ringing and how to best treat the condition.    Tinnitus Recommendations-  >Avoid loud or sudden noise  >Wear hearing protection in the presence of loud noise  >Avoid irritants like caffeine, nicotine, tonic water, malaria medications, alcohol, aspirin- please tell MD if you are taking any of these medications  >In a quiet environment or while sleeping, use a white noise generator or radio station to provide background noise  >Relaxation and stress management techniques are useful  >Biofeedback  >Complementary medicine may be of benefit  >Wear a hearing aid or tinnitus masker/retrainer  >Psychological counseling may be beneficial in some situations  >Exercise!!  >Restorative Sleep!!    Medical therapy for ringing (may include)-  Do nothing  Medications for ringing  IV medication  Ear perfusion- inner ear surgery to reduce ringing  Hearing Aids, Tinnitus maskers, Tinnitus retrainers  Biofeedback  Psychological counseling    All options will be reviewed at your visit. Treating tinnitus can be difficult and frustrating. There really is no cure but rather control. This can be time consuming to treat. The patient determines how much treatment is warranted if there are no associated medical conditions requiring therapy. Please be patient.     VESTIBULAR EXERCISES:    Goals:  >To develop proprioceptive and visual mechanisms to compensate for a disturbance in balance function (labyrinthine system)  >To improve muscle  "coordination in general  T>o practice balancing under everyday conditions with special attention to using the eyes,  muscle and joint senses  >To train the movement of the eyes independent of the head  >To loosen the muscles of the neck and shoulder to overcome the protective muscular spasm and tendency to move \"in one piece\"  >To practice head movements that cause dizziness and thus gradually overcome the disability  >To encourage the restoration of self-confidence and easy spontaneous motion     Exercise Program:  A. Eye exercises (while seated in bed). Perform 5 to 10 minutes at least 5 times each day; first do slowly, then quickly. Perform 20 times each.  1.     Up and down  2.     Side to side  3.     Diagonal  4.     Focus on finger moving from 3 feet to one foot from face    B. Head exercises. To be done at first slowly with eyes open in primary position, then quickly. Later do with eyes closed. Perform 20 times each.  1.     Bending forward and backward  2.     Turning from side to side  3.     Tilting from side to side  4.     Diagonal movements    C. Coordinate the movement of head and eyes in the same direction as in B.    D. Shoulder shrugging and circling. Perform 20 times each.    E. While seated, bend forward and  objects from the ground. Perform 20 times.    F.  Standing exercises. Perform 20 times each.  1. Repeat section A  2. Change from a sitting to a standing position with the eyes open and shut.  3. Throw ball from hand to hand, above eye level             4. Throw ball from hand to hand, under knee  5. Change form from sitting to standing, turning around in between     Full activity: Perform 10 times each.  1. Walk across room with eyes open, then closed  2. Walk up and down a slope with eyes open, then closed  3. Walk up and down steps with eyes open, then closed  4. Sit up and lie down in bed  5. Stand up and sit down in a chair  6. Recover balance when pushed in any direction  7. " Throw and catch a ball  8. Any game involving stooping, straining and aiming     The following are of value in rehabilitating patients with balance problems:  1. A light cane may be used, not for walking, but to provide additional proprioceptive            orienting input.  2. While walking, the patient should not look down, but rather select a distant point for          fixation.  3. Night-lights should be left on in the patient’s home.  4. In-patients with cervical spine problems, especially in those that head turning        increase unsteadiness, the use of soft foam cervical collar limits motion and improves         stability. Exercises should be modified accordingly.  5. Mild central stimulants (Ritalin, Caffeine) are useful in alerting the patient to respond       quickly to orienting input.  6. Optimal visual correction should be achieved and maintained. If cataract surgery is           being performed, then contact lens are recommended to avoid optical distortion.  7.  Extreme fatigue and stress is to be avoided, as this may worsen the dizziness.  8.  Sedatives, vestibular suppressants and tranquilizers (Valium, Phenergan, Antivert, Dramamine, Klonopin, etc.) are to be avoided, as this may slow compensation by the brain and cause drowsiness.

## 2024-10-02 ENCOUNTER — OFFICE VISIT (OUTPATIENT)
Dept: CARDIOLOGY | Facility: CLINIC | Age: 74
End: 2024-10-02
Payer: MEDICARE

## 2024-10-02 VITALS
OXYGEN SATURATION: 98 % | HEART RATE: 70 BPM | BODY MASS INDEX: 31.34 KG/M2 | WEIGHT: 195 LBS | HEIGHT: 66 IN | DIASTOLIC BLOOD PRESSURE: 74 MMHG | SYSTOLIC BLOOD PRESSURE: 130 MMHG

## 2024-10-02 DIAGNOSIS — E66.09 NON MORBID OBESITY DUE TO EXCESS CALORIES: Chronic | ICD-10-CM

## 2024-10-02 DIAGNOSIS — E78.2 MIXED HYPERLIPIDEMIA: Chronic | ICD-10-CM

## 2024-10-02 DIAGNOSIS — I10 PRIMARY HYPERTENSION: Chronic | ICD-10-CM

## 2024-10-02 DIAGNOSIS — Z78.9 STATIN INTOLERANCE: Chronic | ICD-10-CM

## 2024-10-02 DIAGNOSIS — Z95.5 S/P CORONARY ARTERY STENT PLACEMENT: Chronic | ICD-10-CM

## 2024-10-02 DIAGNOSIS — I25.10 ATHEROSCLEROSIS OF NATIVE CORONARY ARTERY OF NATIVE HEART WITHOUT ANGINA PECTORIS: Primary | Chronic | ICD-10-CM

## 2024-10-02 PROCEDURE — 3078F DIAST BP <80 MM HG: CPT | Performed by: NURSE PRACTITIONER

## 2024-10-02 PROCEDURE — 93000 ELECTROCARDIOGRAM COMPLETE: CPT | Performed by: NURSE PRACTITIONER

## 2024-10-02 PROCEDURE — 3075F SYST BP GE 130 - 139MM HG: CPT | Performed by: NURSE PRACTITIONER

## 2024-10-02 PROCEDURE — 99214 OFFICE O/P EST MOD 30 MIN: CPT | Performed by: NURSE PRACTITIONER

## 2024-10-02 NOTE — ASSESSMENT & PLAN NOTE
Wasn't controlled with Zetia and Welchol. Unknown control since starting Leqvio - last injection 4/2024. He anticipates getting labs done at [facility] next month including CBC, CMP, and lipid panel. Hx statin intolerance. Again encouraged healthy diet and routine aerobic exercise.

## 2024-10-02 NOTE — PROGRESS NOTES
"Encounter Date:10/02/2024  Chief Complaint:   Subjective    Apolinar Snow is a 73 y.o. male who presents to [facility] today for yearly cardiac follow-up. He continues to do well and is enjoying his assisted.      Hypertension    Hyperlipidemia    Coronary Artery Disease  Risk factors include hyperlipidemia.   Follow-up  Conditions present:  Hyperlipidemia       HPI       Hypertension     Additional comments: Usually \"normal\" 120s/70s at urology. Doesn't check at home. No headaches or nosebleeds. Chronic dizziness from inner ear - needs hearing aids.             Hyperlipidemia     Additional comments: Last checked 10/2023. Trying to eat healthy. Doesn't each junk food and has cut back on soda to once a day.             Coronary Artery Disease     Additional comments: No chest discomfort. Trying to eat healthy and stay active. Not exercising. Still on aspirin - bowels doing better on budesonide. Goes next week to talk about prostate biopsy. Hx L CX stent 2015 patent 2019. 40% RCA.             Follow-up     Additional comments: Yearly          Last edited by Massiel Mirza APRN on 10/2/2024  2:11 PM.        History: Past medical, surgical, family, and social history reviewed.    Outpatient Medications Marked as Taking for the 10/2/24 encounter (Office Visit) with Massiel Mirza APRN   Medication Sig Dispense Refill    acebutolol (SECTRAL) 200 MG capsule Take 1 capsule by mouth Daily.      alfuzosin (UROXATRAL) 10 MG 24 hr tablet alfuzosin ER 10 mg tablet,extended release 24 hr      aspirin 81 MG EC tablet Take 1 tablet by mouth Daily.      Budesonide (ENTOCORT EC) 3 MG 24 hr capsule Take 3 capsules by mouth Every Morning.      cetirizine (ZyrTEC) 10 MG tablet Take 1 tablet by mouth Daily.      Coenzyme Q10 (CO Q-10) 100 MG capsule Take 100 mg by mouth Daily.      colesevelam (WELCHOL) 625 MG tablet Take 1 tablet by mouth Daily.      ezetimibe (ZETIA) 10 MG tablet " "Take 1 tablet by mouth Daily.      finasteride (PROSCAR) 5 MG tablet Take 1 tablet by mouth Daily.      FLUoxetine (PROzac) 10 MG capsule Take 1 capsule by mouth Daily.      Inclisiran Sodium 284 MG/1.5ML solution prefilled syringe Inject 1.5 mL under the skin into the appropriate area as directed Every 6 (Six) Months. Indications: Disease involving Lipid Deposits in the Arteries, High Amount of Fats in the Blood      levothyroxine (SYNTHROID, LEVOTHROID) 125 MCG tablet Take 150 mcg by mouth Daily.      meclizine (ANTIVERT) 12.5 MG tablet Take 1 tablet by mouth 3 (Three) Times a Day As Needed for Dizziness.      naproxen (NAPROSYN) 500 MG tablet naproxen 500 mg tablet   prn      nitroglycerin (NITROSTAT) 0.4 MG SL tablet Place 1 tablet under the tongue Every 5 (Five) Minutes As Needed for Chest Pain. Take no more than 3 doses in 15 minutes. 25 tablet 11    ondansetron ODT (ZOFRAN-ODT) 8 MG disintegrating tablet       oxybutynin (DITROPAN) 5 MG tablet oxybutynin chloride 5 mg tablet      vitamin B-12 (CYANOCOBALAMIN) 500 MCG tablet Take 0.5 tablets by mouth Daily.      vitamin D (ERGOCALCIFEROL) 26706 UNITS capsule capsule Take 1 capsule by mouth 1 (One) Time Per Week.          Objective     Vital Signs:   /74 (BP Location: Left arm, Patient Position: Sitting, Cuff Size: Adult)   Pulse 70   Ht 167.6 cm (65.98\")   Wt 88.5 kg (195 lb)   SpO2 98%   BMI 31.49 kg/m²   Wt Readings from Last 3 Encounters:   10/02/24 88.5 kg (195 lb)   09/09/24 87.5 kg (193 lb)   08/07/24 86.2 kg (190 lb)         Vitals reviewed.   Constitutional:       Appearance: Well-developed.   Eyes:      General: No scleral icterus.  HENT:      Right Ear: Decreased hearing noted.      Left Ear: Decreased hearing noted.   Neck:      Vascular: No JVD.   Pulmonary:      Effort: Pulmonary effort is normal.      Breath sounds: Normal breath sounds.   Cardiovascular:      Normal rate. Regular rhythm.      No gallop.    Pulses:     Intact distal " pulses.   Edema:     Peripheral edema absent.   Musculoskeletal:      Comments: Wearing knee compression bilaterally Skin:     General: Skin is warm and dry.   Neurological:      Mental Status: Alert and oriented to person, place, and time.         Result Review  Data Reviewed:  The following data was reviewed by: JOSEPH Gómez on 10/02/2024  MRI Internal Auditory Canal With Wo (08/07/2024 15:48)   Lab Results - Last 18 Months   Lab Units 08/07/24  1457   CREATININE mg/dL 1.00            ECG 12 Lead    Date/Time: 10/2/2024 2:44 PM  Performed by: Massiel Mirza APRN    Authorized by: Massiel Mirza APRN  Comparison: compared with previous ECG from 9/29/2023  Similar to previous ECG  Rhythm: sinus rhythm  Rate: normal  BPM: 66  Conduction: left bundle branch block    Clinical impression: abnormal EKG             Assessment and Plan   Problem List Items Addressed This Visit          Allergies and Adverse Reactions    Statin intolerance (Chronic)       Cardiac and Vasculature    Atherosclerosis of native coronary artery of native heart - Primary (Chronic)    Overview     40% RCA, patent L CX stent per 2019 cath         Current Assessment & Plan     Remains stable without angina. Continue present therapy. Call if worsens. Again encouraged lifestyle modifications. Continue EC aspirin 81 mg indefinitely.         Hyperlipidemia (Chronic)    Current Assessment & Plan     Wasn't controlled with Zetia and Welchol. Unknown control since starting Leqvio - last injection 4/2024. He anticipates getting labs done at [facility] next month including CBC, CMP, and lipid panel. Hx statin intolerance. Again encouraged healthy diet and routine aerobic exercise.         Hypertension (Chronic)    Current Assessment & Plan     Remains fairly well controlled with BPH medications. Continue present therapy. Encouraged him to check more regularly at home and call if not staying controlled.         S/P coronary artery stent  placement (Chronic)       Endocrine and Metabolic    Non morbid obesity due to excess calories (Chronic)    Current Assessment & Plan     Patient's (Body mass index is 31.49 kg/m².) indicates that they are obese (BMI >30) with health conditions that include hypertension, coronary heart disease and dyslipidemias . Weight is worsening. BMI is is above average; BMI management plan is completed. We discussed portion control and increasing exercise.             Patient was given instructions and counseling regarding his condition or for health maintenance advice. Please see specific information pulled into the AVS if appropriate.    Follow Up :   Return in about 1 year (around 10/2/2025) for Recheck.                  JOSEPH Gallardo, ACNP-BC, CHFN-BC

## 2024-10-02 NOTE — ASSESSMENT & PLAN NOTE
Remains fairly well controlled with BPH medications. Continue present therapy. Encouraged him to check more regularly at home and call if not staying controlled.

## 2024-10-02 NOTE — ASSESSMENT & PLAN NOTE
Remains stable without angina. Continue present therapy. Call if worsens. Again encouraged lifestyle modifications. Continue EC aspirin 81 mg indefinitely.

## 2024-10-02 NOTE — ASSESSMENT & PLAN NOTE
Patient's (Body mass index is 31.49 kg/m².) indicates that they are obese (BMI >30) with health conditions that include hypertension, coronary heart disease and dyslipidemias . Weight is worsening. BMI is is above average; BMI management plan is completed. We discussed portion control and increasing exercise.

## 2025-06-23 ENCOUNTER — TELEPHONE (OUTPATIENT)
Dept: CARDIOLOGY | Facility: CLINIC | Age: 75
End: 2025-06-23
Payer: MEDICARE

## 2025-06-23 DIAGNOSIS — E78.2 MIXED HYPERLIPIDEMIA: Chronic | ICD-10-CM

## 2025-06-23 DIAGNOSIS — Z95.5 PRESENCE OF STENT IN CORONARY ARTERY: ICD-10-CM

## 2025-06-23 DIAGNOSIS — Z78.9 STATIN INTOLERANCE: Chronic | ICD-10-CM

## 2025-06-23 DIAGNOSIS — Z78.9 STATIN INTOLERANCE: Primary | Chronic | ICD-10-CM

## 2025-06-23 DIAGNOSIS — I25.10 ATHEROSCLEROSIS OF NATIVE CORONARY ARTERY OF NATIVE HEART WITHOUT ANGINA PECTORIS: Chronic | ICD-10-CM

## 2025-06-23 NOTE — TELEPHONE ENCOUNTER
Attempted to call pt with [facility]dem[facility] | [employer] appt. Scheduled 7-1-25 at 11 am, [facility]/ infusion center.

## 2025-06-23 NOTE — TELEPHONE ENCOUNTER
I need a copy of his most recent labs from his PCP - if not done in the past year, will need to get done. TX!

## 2025-06-23 NOTE — TELEPHONE ENCOUNTER
Pt's wife called asking about his next injection for cholesterol medication.  She stated that he has not heard anything about when his next one is.  Please advise.

## 2025-06-24 DIAGNOSIS — Z78.9 STATIN INTOLERANCE: ICD-10-CM

## 2025-06-24 DIAGNOSIS — E78.2 MIXED HYPERLIPIDEMIA: Primary | ICD-10-CM

## 2025-06-24 RX ORDER — FAMOTIDINE 10 MG/ML
20 INJECTION, SOLUTION INTRAVENOUS AS NEEDED
OUTPATIENT
Start: 2025-07-01

## 2025-06-24 RX ORDER — DIPHENHYDRAMINE HYDROCHLORIDE 50 MG/ML
50 INJECTION, SOLUTION INTRAMUSCULAR; INTRAVENOUS AS NEEDED
OUTPATIENT
Start: 2025-07-01

## 2025-06-24 RX ORDER — HYDROCORTISONE SODIUM SUCCINATE 100 MG/2ML
100 INJECTION INTRAMUSCULAR; INTRAVENOUS AS NEEDED
OUTPATIENT
Start: 2025-07-01

## 2025-07-07 ENCOUNTER — INFUSION (OUTPATIENT)
Dept: ONCOLOGY | Facility: HOSPITAL | Age: 75
End: 2025-07-07
Payer: MEDICARE

## 2025-07-07 VITALS
HEIGHT: 66 IN | BODY MASS INDEX: 30.05 KG/M2 | RESPIRATION RATE: 18 BRPM | HEART RATE: 62 BPM | WEIGHT: 187 LBS | TEMPERATURE: 97.1 F | SYSTOLIC BLOOD PRESSURE: 118 MMHG | DIASTOLIC BLOOD PRESSURE: 67 MMHG | OXYGEN SATURATION: 98 %

## 2025-07-07 DIAGNOSIS — E78.2 MIXED HYPERLIPIDEMIA: Primary | ICD-10-CM

## 2025-07-07 DIAGNOSIS — Z95.5 S/P CORONARY ARTERY STENT PLACEMENT: Chronic | ICD-10-CM

## 2025-07-07 DIAGNOSIS — E78.2 MIXED HYPERLIPIDEMIA: Chronic | ICD-10-CM

## 2025-07-07 DIAGNOSIS — Z78.9 STATIN INTOLERANCE: ICD-10-CM

## 2025-07-07 DIAGNOSIS — I25.10 ATHEROSCLEROSIS OF NATIVE CORONARY ARTERY OF NATIVE HEART WITHOUT ANGINA PECTORIS: Primary | Chronic | ICD-10-CM

## 2025-07-07 PROCEDURE — 96372 THER/PROPH/DIAG INJ SC/IM: CPT

## 2025-07-07 PROCEDURE — 25010000002 INCLISIRAN SODIUM 284 MG/1.5ML SOLUTION PREFILLED SYRINGE: Performed by: NURSE PRACTITIONER

## 2025-07-07 RX ORDER — DIPHENHYDRAMINE HYDROCHLORIDE 50 MG/ML
50 INJECTION, SOLUTION INTRAMUSCULAR; INTRAVENOUS AS NEEDED
Status: DISCONTINUED | OUTPATIENT
Start: 2025-07-07 | End: 2025-07-07 | Stop reason: HOSPADM

## 2025-07-07 RX ORDER — FAMOTIDINE 10 MG/ML
20 INJECTION, SOLUTION INTRAVENOUS AS NEEDED
Status: DISCONTINUED | OUTPATIENT
Start: 2025-07-07 | End: 2025-07-07 | Stop reason: HOSPADM

## 2025-07-07 RX ORDER — HYDROCORTISONE SODIUM SUCCINATE 100 MG/2ML
100 INJECTION INTRAMUSCULAR; INTRAVENOUS AS NEEDED
Status: DISCONTINUED | OUTPATIENT
Start: 2025-07-07 | End: 2025-07-07 | Stop reason: HOSPADM

## 2025-07-07 RX ORDER — FAMOTIDINE 10 MG/ML
20 INJECTION, SOLUTION INTRAVENOUS AS NEEDED
OUTPATIENT
Start: 2025-10-05

## 2025-07-07 RX ORDER — HYDROCORTISONE SODIUM SUCCINATE 100 MG/2ML
100 INJECTION INTRAMUSCULAR; INTRAVENOUS AS NEEDED
OUTPATIENT
Start: 2025-10-05

## 2025-07-07 RX ORDER — DIPHENHYDRAMINE HYDROCHLORIDE 50 MG/ML
50 INJECTION, SOLUTION INTRAMUSCULAR; INTRAVENOUS AS NEEDED
OUTPATIENT
Start: 2025-10-05

## 2025-07-07 RX ADMIN — INCLISIRAN 284 MG: 284 INJECTION, SOLUTION SUBCUTANEOUS at 11:17

## 2025-07-07 NOTE — PROGRESS NOTES
Secure chat with Massiel Mirza in cardiology to ask if patient needed labs to r/t treatment plan that has nursing communication to check labs with 90 day dose then annually. Hermes responded: Let's check a panel in 60 days at his local lab after getting today's dose. He can come by to  the order or we can fax to his lab of choice.  I informed patient of this and that there is a lab order in expected in September in our system as well so patient can use that order or contact his cardiologist provider to have it done elsewhere. Patient agreeable and no questions. Kiara Monroe RN

## (undated) DEVICE — PK CATH CARD 30 CA/4

## (undated) DEVICE — ANGIO-SEAL VIP VASCULAR CLOSURE DEVICE: Brand: ANGIO-SEAL

## (undated) DEVICE — SINGLE PORT MANIFOLD: Brand: NEPTUNE 2

## (undated) DEVICE — GW STARTER FXD CORE J .035 3X150CM 3MM

## (undated) DEVICE — BRUSH ENDOSCP 2 END CHN HEDGEHOG

## (undated) DEVICE — SOL IRR NACL 0.9PCT BT 1000ML

## (undated) DEVICE — BITE BLOCK ENDOSCP AD 60 FR W/ ADJ STRP PLAS GRN BLOX

## (undated) DEVICE — A2000 MULTI-USE SYRINGE KIT, P/N 701277-003KIT CONTENTS: 100ML CONTRAST RESERVOIR AND TUBING WITH CONTRAST SPIKE AND CLAMP: Brand: A2000 MULTI-USE SYRINGE KIT

## (undated) DEVICE — SPONGE ENDOSCP CLN BS INF PREVENTION KOALA

## (undated) DEVICE — CATH DIAG IMPULSE M/ PK 145 5FR

## (undated) DEVICE — PINNACLE INTRODUCER SHEATH: Brand: PINNACLE

## (undated) DEVICE — FORCEPS BX 240CM 2.4MM L NDL RAD JAW 4 M00513334

## (undated) DEVICE — CANNULA NSL AD L7FT DIV O2 CO2 W/ M LUERLOCK TRMPT CONN

## (undated) DEVICE — ADAPTER CLEANING PORPOISE CLEANING

## (undated) DEVICE — ELECTRD PAD DEFIB A/

## (undated) DEVICE — MODEL BT2000 P/N 700287-012KIT CONTENTS: MANIFOLD WITH SALINE AND CONTRAST PORTS, SALINE TUBING WITH SPIKE AND HAND SYRINGE, TRANSDUCER: Brand: BT2000 AUTOMATED MANIFOLD KIT

## (undated) DEVICE — ENDO KIT,LOURDES HOSPITAL: Brand: MEDLINE INDUSTRIES, INC.

## (undated) DEVICE — Device

## (undated) DEVICE — SOLIDIFIER LIQUI LOC PLUS 2000CC

## (undated) DEVICE — ENDO KIT: Brand: MEDLINE INDUSTRIES, INC.

## (undated) DEVICE — CANN CO2/O2 NASL A/

## (undated) DEVICE — MODEL AT P65, P/N 701554-001KIT CONTENTS: HAND CONTROLLER, 3-WAY HIGH-PRESSURE STOPCOCK WITH ROTATING END AND PREMIUM HIGH-PRESSURE TUBING: Brand: ANGIOTOUCH® KIT